# Patient Record
Sex: MALE | Race: WHITE | Employment: UNEMPLOYED | URBAN - METROPOLITAN AREA
[De-identification: names, ages, dates, MRNs, and addresses within clinical notes are randomized per-mention and may not be internally consistent; named-entity substitution may affect disease eponyms.]

---

## 2017-02-05 DIAGNOSIS — F41.9 ANXIETY: ICD-10-CM

## 2017-02-06 RX ORDER — LORAZEPAM 1 MG/1
TABLET ORAL
Qty: 5 TAB | Refills: 0 | Status: SHIPPED | OUTPATIENT
Start: 2017-02-06 | End: 2020-08-11

## 2017-02-06 NOTE — TELEPHONE ENCOUNTER
From: Maribel Haney  To: Bertha Elder MD  Sent: 2/5/2017 11:06 PM EST  Subject: Medication Renewal Request    Original authorizing provider: MD Maribel Willis would like a refill of the following medications:  LORazepam (ATIVAN) 1 mg tablet [Binh Amaya MD]    Preferred pharmacy: [facility]    Comment:

## 2017-02-09 ENCOUNTER — OFFICE VISIT (OUTPATIENT)
Dept: INTERNAL MEDICINE CLINIC | Age: 32
End: 2017-02-09

## 2017-02-09 VITALS
RESPIRATION RATE: 14 BRPM | HEART RATE: 75 BPM | WEIGHT: 185.2 LBS | OXYGEN SATURATION: 98 % | DIASTOLIC BLOOD PRESSURE: 74 MMHG | TEMPERATURE: 98.8 F | SYSTOLIC BLOOD PRESSURE: 110 MMHG | BODY MASS INDEX: 27.43 KG/M2 | HEIGHT: 69 IN

## 2017-02-09 DIAGNOSIS — K80.20 CALCULUS OF GALLBLADDER WITHOUT CHOLECYSTITIS WITHOUT OBSTRUCTION: ICD-10-CM

## 2017-02-09 DIAGNOSIS — J02.9 SORE THROAT: ICD-10-CM

## 2017-02-09 DIAGNOSIS — R52 BODY ACHES: ICD-10-CM

## 2017-02-09 DIAGNOSIS — J06.9 VIRAL UPPER RESPIRATORY TRACT INFECTION: Primary | ICD-10-CM

## 2017-02-09 DIAGNOSIS — R68.83 CHILLS: ICD-10-CM

## 2017-02-09 LAB
QUICKVUE INFLUENZA TEST: NEGATIVE
S PYO AG THROAT QL: NEGATIVE
VALID INTERNAL CONTROL?: YES
VALID INTERNAL CONTROL?: YES

## 2017-02-09 RX ORDER — PAROXETINE HYDROCHLORIDE 20 MG/1
20 TABLET, FILM COATED ORAL 2 TIMES DAILY
COMMUNITY
End: 2020-08-11

## 2017-02-09 NOTE — PROGRESS NOTES
Subjective:   Teddy Swift is a 27 y.o. male who complains of congestion, sore throat, post nasal drip, productive cough, myalgias, headache, bilateral sinus pain and hoarseness, subjective fever for 3 days, gradually worsening since that time. He denies a history of shortness of breath and wheezing. Evaluation to date: none. Treatment to date: OTC products. Patient does not smoke cigarettes. Relevant PMH: No pertinent additional PMH. Current Outpatient Prescriptions   Medication Sig Dispense Refill    PARoxetine (PAXIL) 20 mg tablet Take  by mouth two (2) times a day.  LORazepam (ATIVAN) 1 mg tablet TAKE ONE TABLET BY MOUTH ONCE DAILY AS NEEDED FOR ANXIETY 5 Tab 0    traMADol (ULTRAM) 50 mg tablet Take 1 Tab by mouth every six (6) hours as needed for Pain. Max Daily Amount: 200 mg. 30 Tab 0    ciclopirox (LOPROX) 0.77 % topical cream Apply  to affected area two (2) times a day. 30 g 0     No Known Allergies     Review of Systems  Pertinent items are noted in HPI. Objective:     Visit Vitals    /74    Pulse 75    Temp 98.8 °F (37.1 °C) (Oral)    Resp 14    Ht 5' 9.35\" (1.761 m)    Wt 185 lb 3.2 oz (84 kg)    SpO2 98%    BMI 27.07 kg/m2     General:  alert, cooperative, no distress   Eyes: negative   Ears: normal TM's and external ear canals AU   Sinuses: Nasal congestion, sinus nontender   Mouth:  abnormal findings: mild oropharyngeal erythema   Neck: supple, symmetrical, trachea midline and no adenopathy. Heart: S1 and S2 normal, no murmurs noted. Lungs: clear to auscultation bilaterally   Abdomen: soft, non-tender. Bowel sounds normal. No masses,  no organomegaly        Assessment/Plan:       ICD-10-CM ICD-9-CM    1. Viral upper respiratory tract infection J06.9 465.9     B97.89     2. Calculus of gallbladder without cholecystitis without obstruction  Was supposed to call when he got back from [place] [facility]) for surgical referral for intermittent gallstone colic.  He is not currently having a problem but would like referral. K80.20 574.20 REFERRAL TO GENERAL SURGERY   . Suggested symptomatic OTC remedies. RTC prn. Discussed diagnosis and treatment of viral URIs. Discussed the importance of avoiding unnecessary antibiotic therapy. .  Orders Placed This Encounter    REFERRAL TO GENERAL SURGERY    AMB POC RAPID INFLUENZA TEST    AMB POC RAPID STREP A    PARoxetine (PAXIL) 20 mg tablet    I have discussed the diagnosis with the patient and the intended plan as seen in the above orders. The patient has received an after-visit summary and questions were answered concerning future plans. I have discussed medication side effects and warnings with the patient as well. He has expressed understanding of the diagnosis and plan    Follow-up Disposition:  Return if symptoms worsen or fail to improve.

## 2017-02-09 NOTE — LETTER
NOTIFICATION RETURN TO WORK / SCHOOL 
 
2/9/2017 4:30 PM 
 
Mr. Martinez Arriaza 
[address] 
[address] [phone] To Whom It May Concern: 
 
Martinez Arriaza is currently under the care of [facility]. He will return to work/school on: 2/11/2017 If there are questions or concerns please have the patient contact our office. Sincerely, Shaniqua Britton MD

## 2017-02-09 NOTE — PROGRESS NOTES
Chief Complaint   Patient presents with    Cold Symptoms     runny nose, chills, body aches x 3 days     Gallstones

## 2017-02-15 ENCOUNTER — OFFICE VISIT (OUTPATIENT)
Dept: SURGERY | Age: 32
End: 2017-02-15

## 2017-02-15 VITALS
HEART RATE: 84 BPM | BODY MASS INDEX: 26.48 KG/M2 | WEIGHT: 185 LBS | RESPIRATION RATE: 16 BRPM | TEMPERATURE: 98.3 F | OXYGEN SATURATION: 98 % | SYSTOLIC BLOOD PRESSURE: 120 MMHG | DIASTOLIC BLOOD PRESSURE: 70 MMHG | HEIGHT: 70 IN

## 2017-02-15 DIAGNOSIS — K80.20 SYMPTOMATIC CHOLELITHIASIS: Primary | ICD-10-CM

## 2017-02-15 RX ORDER — ALPRAZOLAM 0.5 MG/1
TABLET ORAL
COMMUNITY
End: 2017-10-17

## 2017-02-15 NOTE — PROGRESS NOTES
1. Have you been to the ER, urgent care clinic since your last visit? Hospitalized since your last visit? No    2. Have you seen or consulted any other health care providers outside of the [address] [place] [address] since your last visit? Include any pap smears or colon screening.  No

## 2017-02-15 NOTE — MR AVS SNAPSHOT
Visit Information Date & Time Provider Department Dept. Phone Encounter #  
 2/15/2017  3:20 PM Mario Marie, [address] [facility] [phone] [id number] Follow-up Instructions Routing History Upcoming Health Maintenance Date Due DTaP/Tdap/Td series (2 - Td) 12/8/2019 Allergies as of 2/15/2017  Review Complete On: 2/15/2017 By: Mario Marie MD  
 No Known Allergies Current Immunizations  Reviewed on 9/21/2015 Name Date Hepatitis B Vaccine 12/8/2009 Influenza Vaccine 11/1/2013 Pneumococcal Vaccine (Unspecified Type) 12/8/2009 Rabies Vaccine 12/8/2009 TDAP Vaccine 12/8/2009 Not reviewed this visit You Were Diagnosed With   
  
 Codes Comments Symptomatic cholelithiasis    -  Primary ICD-10-CM: K80.20 ICD-9-CM: 574.20 Vitals BP Pulse Temp Resp Height(growth percentile) Weight(growth percentile) 120/70 (BP 1 Location: Right arm, BP Patient Position: Sitting) 84 98.3 °F (36.8 °C) (Oral) 16 5' 9.5\" (1.765 m) 185 lb (83.9 kg) SpO2 BMI Smoking Status 98% 26.93 kg/m2 Current Every Day Smoker BMI and BSA Data Body Mass Index Body Surface Area  
 26.93 kg/m 2 2.03 m 2 Preferred Pharmacy Pharmacy Name Phone [facility] [address], [address] [place] [address] [phone] Your Updated Medication List  
  
   
This list is accurate as of: 2/15/17  4:39 PM.  Always use your most recent med list.  
  
  
  
  
 ciclopirox 0.77 % topical cream  
Commonly known as:  Elizabeth Pott Apply  to affected area two (2) times a day. LORazepam 1 mg tablet Commonly known as:  ATIVAN  
TAKE ONE TABLET BY MOUTH ONCE DAILY AS NEEDED FOR ANXIETY PAXIL 20 mg tablet Generic drug:  PARoxetine Take  by mouth two (2) times a day. traMADol 50 mg tablet Commonly known as:  ULTRAM  
Take 1 Tab by mouth every six (6) hours as needed for Pain.  Max Daily Amount: 200 mg. XANAX 0.5 mg tablet Generic drug:  ALPRAZolam  
Take  by mouth. Introducing \[facility] [place] [facility]\"" & HEALTH SERVICES! Dear Erika Chandler: 
Thank you for requesting a [employer] account. Our records indicate that you already have an active [employer] account. You can access your account anytime at [email or web address] Did you know that you can access your hospital and ER discharge instructions at any time in [employer]? You can also review all of your test results from your hospital stay or ER visit. Additional Information If you have questions, please visit the Frequently Asked Questions section of the [employer] website at [email or web address] Remember, [employer] is NOT to be used for urgent needs. For medical emergencies, dial 911. Now available from your iPhone and Android! Please provide this summary of care documentation to your next provider. Your primary care clinician is listed as Soraya Vázquez. If you have any questions after today's visit, please call [phone].

## 2017-02-15 NOTE — PROGRESS NOTES
Surgery Consult    Subjective:      Genesis Abdalla is a 27 y.o.  male who was referred by Dr. Nery Dsouza for evaluation of cholelithiasis. Patient reports he has been having symptoms of gas pain, RUQ pain, back pain, cold sweats, and dysphagia in the last 12-13 years but it has gotten worse in the last couple of years. He notes the symptoms occurs every time with spicy foods and sometimes with fatty foods. He states that forced regurgitation provides limited relief. 11/10/16 U/S ABD:  5 mm gallstone or polyp. No specific evidence of acute  Cholecystitis. Chief Complaint   Patient presents with    Abdominal Pain     11-10-16 U/S- gallstone polyp     Patient Active Problem List    Diagnosis Date Noted    Symptomatic cholelithiasis 02/15/2017    Tobacco use 05/27/2016    Vasovagal syncope 07/31/2015    Depression 11/07/2012    Anxiety 11/07/2012     Past Medical History   Diagnosis Date    Anxiety     Depression     Positive test for herpes simplex virus (HSV) antibody 2015     Type 1 & 2    Symptomatic cholelithiasis 2/15/2017    Tobacco use 5/27/2016    Vasovagal syncope 7/31/2015     Seen at ED in [facility], thought to be seizure due to Wellbutrin. Did see neurology (Dr Alli Domínguez). Thought to be syncope       History reviewed. No pertinent past surgical history. Social History   Substance Use Topics    Smoking status: Current Every Day Smoker     Packs/day: 0.50     Years: 7.00     Types: Cigarettes    Smokeless tobacco: Never Used    Alcohol use No      Family History   Problem Relation Age of Onset    Thyroid Disease Mother      hypothyroid    No Known Problems Father     No Known Problems Sister     No Known Problems Sister     No Known Problems Sister     No Known Problems Sister     Seizures Paternal Aunt       Current Outpatient Prescriptions   Medication Sig    ALPRAZolam (XANAX) 0.5 mg tablet Take  by mouth.  PARoxetine (PAXIL) 20 mg tablet Take  by mouth two (2) times a day.     LORazepam (ATIVAN) 1 mg tablet TAKE ONE TABLET BY MOUTH ONCE DAILY AS NEEDED FOR ANXIETY    traMADol (ULTRAM) 50 mg tablet Take 1 Tab by mouth every six (6) hours as needed for Pain. Max Daily Amount: 200 mg.    ciclopirox (LOPROX) 0.77 % topical cream Apply  to affected area two (2) times a day. No current facility-administered medications for this visit. No Known Allergies     Review of Systems:    A comprehensive review of systems was negative except for that written in the History of Present Illness. Objective:     Visit Vitals    /70 (BP 1 Location: Right arm, BP Patient Position: Sitting)    Pulse 84    Temp 98.3 °F (36.8 °C) (Oral)    Resp 16    Ht 5' 9.5\" (1.765 m)    Wt 185 lb (83.9 kg)    SpO2 98%    BMI 26.93 kg/m2         Physical Exam:  General appearance: alert, cooperative, no distress, appears stated age  Head: Normocephalic, without obvious abnormality, atraumatic  Neurologic: Grossly normal      Assessment:       ICD-10-CM ICD-9-CM    1. Symptomatic cholelithiasis K80.20 574.20          Plan:     Patient has elected for cholecystectomy. Risks and benefits explained and he will schedule an an appointment at his earliest convenience. Written by didi Sandoval, as dictated by Estevan Mayfield MD.    Total face to face time with patient: 13 minutes. Greater than 50% of the time was spent in counseling. Signed By: Estevan Mayfield MD     February 15, 2017

## 2017-02-20 ENCOUNTER — TELEPHONE (OUTPATIENT)
Dept: SURGERY | Age: 32
End: 2017-02-20

## 2017-02-20 NOTE — TELEPHONE ENCOUNTER
[address] [facility],    I have been trying to find a way to get in touch with Wendy Gu, Dr. Moustapha Rios nurse/assistant, but we are not able to locate any contact information for her. I was hoping you could assist or forward this email to her? Milad Sears is scheduled for Surgery on Friday, February 24th and needs a letter documenting the number days he will need to be off work and the amount of time he may have lifting restrictions, or other restrictions. He works in the [employer] department, at [employer], and is responsible for pushing, pulling and lifting up to 60 lbs. They have requested a letter stating the number of days off, lifting restrictions afterwards, etc. and have asked that he submit this to the HR Department by Wednesday. He would be able to come to  the letter at the earliest opportunity or it could be faxed to: [phone]    We greatly appreciate your assistance in getting this information to Wendy Gu. Thank you,    Nadege Padilla [address] [place] [address]  [employer] [place], [place]    Direct: [phone]  Toll Free: [phone]   Skype: [email or web address]  Fax: [phone]  Email: [email or web address]  Website: [email or web address]     [employer]   Joyce Lawrence

## 2017-02-20 NOTE — LETTER
NOTIFICATION OF RETURN TO WORK  
 
2/21/2017 11:13 AM 
 
Mr. Annika Serrano 
[address] 
[address] [phone] [facility] To Whom It May Concern: 
 
Annika Serrano was under the care of Jessy [facility]ina [facility] from 2-24-17  (Surgery date) to present. He will be able to return to work in approximately 1 week, tentative pending recovery, with no lifting over 10 pounds until seen back in the office on 3-13-17. If there are questions or concerns please have the patient contact our office. Sincerely, Aurelio Frank MD/brianna

## 2017-02-23 ENCOUNTER — ANESTHESIA EVENT (OUTPATIENT)
Dept: SURGERY | Age: 32
End: 2017-02-23
Payer: COMMERCIAL

## 2017-02-24 ENCOUNTER — ANESTHESIA (OUTPATIENT)
Dept: SURGERY | Age: 32
End: 2017-02-24
Payer: COMMERCIAL

## 2017-02-24 ENCOUNTER — HOSPITAL ENCOUNTER (OUTPATIENT)
Age: 32
Setting detail: OUTPATIENT SURGERY
Discharge: HOME | End: 2017-02-24
Attending: SURGERY | Admitting: SURGERY
Payer: COMMERCIAL

## 2017-02-24 VITALS
HEART RATE: 71 BPM | TEMPERATURE: 97.5 F | RESPIRATION RATE: 13 BRPM | DIASTOLIC BLOOD PRESSURE: 71 MMHG | SYSTOLIC BLOOD PRESSURE: 108 MMHG | BODY MASS INDEX: 28.04 KG/M2 | HEIGHT: 68 IN | OXYGEN SATURATION: 97 % | WEIGHT: 185 LBS

## 2017-02-24 PROCEDURE — 77030019908 HC STETH ESOPH SIMS -A: Performed by: ANESTHESIOLOGY

## 2017-02-24 PROCEDURE — 77030008756 HC TU IRR SUC STRY -B: Performed by: SURGERY

## 2017-02-24 PROCEDURE — 77030035045 HC TRCR ENDOSC VRSPRT BLDLSS COVD -B: Performed by: SURGERY

## 2017-02-24 PROCEDURE — 76010000149 HC OR TIME 1 TO 1.5 HR: Performed by: SURGERY

## 2017-02-24 PROCEDURE — 77030037032 HC INSRT SCIS CLICKLLINE DISP STOR -B: Performed by: SURGERY

## 2017-02-24 PROCEDURE — 77030035048 HC TRCR ENDOSC OPTCL COVD -B: Performed by: SURGERY

## 2017-02-24 PROCEDURE — 74011250636 HC RX REV CODE- 250/636

## 2017-02-24 PROCEDURE — 77030018836 HC SOL IRR NACL ICUM -A: Performed by: SURGERY

## 2017-02-24 PROCEDURE — 77030008684 HC TU ET CUF COVD -B: Performed by: ANESTHESIOLOGY

## 2017-02-24 PROCEDURE — 77030026438 HC STYL ET INTUB CARD -A: Performed by: ANESTHESIOLOGY

## 2017-02-24 PROCEDURE — 74011250636 HC RX REV CODE- 250/636: Performed by: ANESTHESIOLOGY

## 2017-02-24 PROCEDURE — 77030010507 HC ADH SKN DERMBND J&J -B: Performed by: SURGERY

## 2017-02-24 PROCEDURE — 74011000250 HC RX REV CODE- 250

## 2017-02-24 PROCEDURE — 76210000020 HC REC RM PH II FIRST 0.5 HR: Performed by: SURGERY

## 2017-02-24 PROCEDURE — 77030018876 HC APPL CLP LIG4 COVD -B: Performed by: SURGERY

## 2017-02-24 PROCEDURE — 77030031139 HC SUT VCRL2 J&J -A: Performed by: SURGERY

## 2017-02-24 PROCEDURE — 77030035029 HC NDL INSUF VERES DISP COVD -B: Performed by: SURGERY

## 2017-02-24 PROCEDURE — 74011000250 HC RX REV CODE- 250: Performed by: SURGERY

## 2017-02-24 PROCEDURE — 77030013079 HC BLNKT BAIR HGGR 3M -A: Performed by: ANESTHESIOLOGY

## 2017-02-24 PROCEDURE — 88304 TISSUE EXAM BY PATHOLOGIST: CPT | Performed by: SURGERY

## 2017-02-24 PROCEDURE — 76060000033 HC ANESTHESIA 1 TO 1.5 HR: Performed by: SURGERY

## 2017-02-24 PROCEDURE — 77030009852 HC PCH RTVR ENDOSC COVD -B: Performed by: SURGERY

## 2017-02-24 PROCEDURE — 77030002933 HC SUT MCRYL J&J -A: Performed by: SURGERY

## 2017-02-24 PROCEDURE — 77030020747 HC TU INSUF ENDOSC TELE -A: Performed by: SURGERY

## 2017-02-24 PROCEDURE — 77030011640 HC PAD GRND REM COVD -A: Performed by: SURGERY

## 2017-02-24 PROCEDURE — 76210000016 HC OR PH I REC 1 TO 1.5 HR: Performed by: SURGERY

## 2017-02-24 PROCEDURE — 77030032490 HC SLV COMPR SCD KNE COVD -B: Performed by: SURGERY

## 2017-02-24 PROCEDURE — 77030035051: Performed by: SURGERY

## 2017-02-24 PROCEDURE — 77030017006 HC DISECTR CRV1 J&J -B: Performed by: SURGERY

## 2017-02-24 RX ORDER — ROCURONIUM BROMIDE 10 MG/ML
INJECTION, SOLUTION INTRAVENOUS AS NEEDED
Status: DISCONTINUED | OUTPATIENT
Start: 2017-02-24 | End: 2017-02-24 | Stop reason: HOSPADM

## 2017-02-24 RX ORDER — MORPHINE SULFATE 2 MG/ML
2 INJECTION, SOLUTION INTRAMUSCULAR; INTRAVENOUS
Status: DISCONTINUED | OUTPATIENT
Start: 2017-02-24 | End: 2017-02-24 | Stop reason: HOSPADM

## 2017-02-24 RX ORDER — BUPIVACAINE HYDROCHLORIDE AND EPINEPHRINE 5; 5 MG/ML; UG/ML
30 INJECTION, SOLUTION EPIDURAL; INTRACAUDAL; PERINEURAL ONCE
Status: COMPLETED | OUTPATIENT
Start: 2017-02-24 | End: 2017-02-24

## 2017-02-24 RX ORDER — SODIUM CHLORIDE 9 MG/ML
50 INJECTION, SOLUTION INTRAVENOUS CONTINUOUS
Status: DISCONTINUED | OUTPATIENT
Start: 2017-02-24 | End: 2017-02-24 | Stop reason: HOSPADM

## 2017-02-24 RX ORDER — FENTANYL CITRATE 50 UG/ML
25 INJECTION, SOLUTION INTRAMUSCULAR; INTRAVENOUS
Status: DISCONTINUED | OUTPATIENT
Start: 2017-02-24 | End: 2017-02-24 | Stop reason: HOSPADM

## 2017-02-24 RX ORDER — NEOSTIGMINE METHYLSULFATE 1 MG/ML
INJECTION INTRAVENOUS AS NEEDED
Status: DISCONTINUED | OUTPATIENT
Start: 2017-02-24 | End: 2017-02-24 | Stop reason: HOSPADM

## 2017-02-24 RX ORDER — FENTANYL CITRATE 50 UG/ML
25 INJECTION, SOLUTION INTRAMUSCULAR; INTRAVENOUS
Status: COMPLETED | OUTPATIENT
Start: 2017-02-24 | End: 2017-02-24

## 2017-02-24 RX ORDER — DIPHENHYDRAMINE HYDROCHLORIDE 50 MG/ML
12.5 INJECTION, SOLUTION INTRAMUSCULAR; INTRAVENOUS AS NEEDED
Status: DISCONTINUED | OUTPATIENT
Start: 2017-02-24 | End: 2017-02-24 | Stop reason: HOSPADM

## 2017-02-24 RX ORDER — SODIUM CHLORIDE 9 MG/ML
1000 INJECTION, SOLUTION INTRAVENOUS CONTINUOUS
Status: DISCONTINUED | OUTPATIENT
Start: 2017-02-24 | End: 2017-02-24 | Stop reason: HOSPADM

## 2017-02-24 RX ORDER — MIDAZOLAM HYDROCHLORIDE 1 MG/ML
1 INJECTION, SOLUTION INTRAMUSCULAR; INTRAVENOUS AS NEEDED
Status: DISCONTINUED | OUTPATIENT
Start: 2017-02-24 | End: 2017-02-24 | Stop reason: HOSPADM

## 2017-02-24 RX ORDER — SODIUM CHLORIDE 0.9 % (FLUSH) 0.9 %
5-10 SYRINGE (ML) INJECTION EVERY 8 HOURS
Status: DISCONTINUED | OUTPATIENT
Start: 2017-02-24 | End: 2017-02-24 | Stop reason: HOSPADM

## 2017-02-24 RX ORDER — SODIUM CHLORIDE 0.9 % (FLUSH) 0.9 %
5-10 SYRINGE (ML) INJECTION AS NEEDED
Status: DISCONTINUED | OUTPATIENT
Start: 2017-02-24 | End: 2017-02-24 | Stop reason: HOSPADM

## 2017-02-24 RX ORDER — FENTANYL CITRATE 50 UG/ML
INJECTION, SOLUTION INTRAMUSCULAR; INTRAVENOUS AS NEEDED
Status: DISCONTINUED | OUTPATIENT
Start: 2017-02-24 | End: 2017-02-24 | Stop reason: HOSPADM

## 2017-02-24 RX ORDER — SUCCINYLCHOLINE CHLORIDE 20 MG/ML
INJECTION INTRAMUSCULAR; INTRAVENOUS AS NEEDED
Status: DISCONTINUED | OUTPATIENT
Start: 2017-02-24 | End: 2017-02-24 | Stop reason: HOSPADM

## 2017-02-24 RX ORDER — HYDROMORPHONE HYDROCHLORIDE 1 MG/ML
0.2 INJECTION, SOLUTION INTRAMUSCULAR; INTRAVENOUS; SUBCUTANEOUS
Status: DISCONTINUED | OUTPATIENT
Start: 2017-02-24 | End: 2017-02-24 | Stop reason: HOSPADM

## 2017-02-24 RX ORDER — SODIUM CHLORIDE, SODIUM LACTATE, POTASSIUM CHLORIDE, CALCIUM CHLORIDE 600; 310; 30; 20 MG/100ML; MG/100ML; MG/100ML; MG/100ML
125 INJECTION, SOLUTION INTRAVENOUS CONTINUOUS
Status: DISCONTINUED | OUTPATIENT
Start: 2017-02-24 | End: 2017-02-24 | Stop reason: HOSPADM

## 2017-02-24 RX ORDER — HYDROCODONE BITARTRATE AND ACETAMINOPHEN 5; 325 MG/1; MG/1
1 TABLET ORAL
Qty: 30 TAB | Refills: 0 | Status: SHIPPED | OUTPATIENT
Start: 2017-02-24 | End: 2017-03-09 | Stop reason: ALTCHOICE

## 2017-02-24 RX ORDER — ONDANSETRON 2 MG/ML
4 INJECTION INTRAMUSCULAR; INTRAVENOUS AS NEEDED
Status: DISCONTINUED | OUTPATIENT
Start: 2017-02-24 | End: 2017-02-24 | Stop reason: HOSPADM

## 2017-02-24 RX ORDER — LIDOCAINE HYDROCHLORIDE 10 MG/ML
0.1 INJECTION, SOLUTION EPIDURAL; INFILTRATION; INTRACAUDAL; PERINEURAL AS NEEDED
Status: DISCONTINUED | OUTPATIENT
Start: 2017-02-24 | End: 2017-02-24 | Stop reason: HOSPADM

## 2017-02-24 RX ORDER — LIDOCAINE HYDROCHLORIDE 20 MG/ML
INJECTION, SOLUTION EPIDURAL; INFILTRATION; INTRACAUDAL; PERINEURAL AS NEEDED
Status: DISCONTINUED | OUTPATIENT
Start: 2017-02-24 | End: 2017-02-24 | Stop reason: HOSPADM

## 2017-02-24 RX ORDER — ONDANSETRON 2 MG/ML
INJECTION INTRAMUSCULAR; INTRAVENOUS AS NEEDED
Status: DISCONTINUED | OUTPATIENT
Start: 2017-02-24 | End: 2017-02-24 | Stop reason: HOSPADM

## 2017-02-24 RX ORDER — MIDAZOLAM HYDROCHLORIDE 1 MG/ML
INJECTION, SOLUTION INTRAMUSCULAR; INTRAVENOUS AS NEEDED
Status: DISCONTINUED | OUTPATIENT
Start: 2017-02-24 | End: 2017-02-24 | Stop reason: HOSPADM

## 2017-02-24 RX ORDER — MIDAZOLAM HYDROCHLORIDE 1 MG/ML
0.5 INJECTION, SOLUTION INTRAMUSCULAR; INTRAVENOUS
Status: DISCONTINUED | OUTPATIENT
Start: 2017-02-24 | End: 2017-02-24 | Stop reason: HOSPADM

## 2017-02-24 RX ORDER — FENTANYL CITRATE 50 UG/ML
50 INJECTION, SOLUTION INTRAMUSCULAR; INTRAVENOUS AS NEEDED
Status: DISCONTINUED | OUTPATIENT
Start: 2017-02-24 | End: 2017-02-24 | Stop reason: HOSPADM

## 2017-02-24 RX ORDER — PROPOFOL 10 MG/ML
INJECTION, EMULSION INTRAVENOUS AS NEEDED
Status: DISCONTINUED | OUTPATIENT
Start: 2017-02-24 | End: 2017-02-24 | Stop reason: HOSPADM

## 2017-02-24 RX ORDER — SODIUM CHLORIDE, SODIUM LACTATE, POTASSIUM CHLORIDE, CALCIUM CHLORIDE 600; 310; 30; 20 MG/100ML; MG/100ML; MG/100ML; MG/100ML
25 INJECTION, SOLUTION INTRAVENOUS CONTINUOUS
Status: DISCONTINUED | OUTPATIENT
Start: 2017-02-24 | End: 2017-02-24 | Stop reason: HOSPADM

## 2017-02-24 RX ORDER — GLYCOPYRROLATE 0.2 MG/ML
INJECTION INTRAMUSCULAR; INTRAVENOUS AS NEEDED
Status: DISCONTINUED | OUTPATIENT
Start: 2017-02-24 | End: 2017-02-24 | Stop reason: HOSPADM

## 2017-02-24 RX ORDER — OXYCODONE AND ACETAMINOPHEN 5; 325 MG/1; MG/1
1 TABLET ORAL AS NEEDED
Status: DISCONTINUED | OUTPATIENT
Start: 2017-02-24 | End: 2017-02-24 | Stop reason: HOSPADM

## 2017-02-24 RX ORDER — DEXAMETHASONE SODIUM PHOSPHATE 4 MG/ML
INJECTION, SOLUTION INTRA-ARTICULAR; INTRALESIONAL; INTRAMUSCULAR; INTRAVENOUS; SOFT TISSUE AS NEEDED
Status: DISCONTINUED | OUTPATIENT
Start: 2017-02-24 | End: 2017-02-24 | Stop reason: HOSPADM

## 2017-02-24 RX ORDER — KETOROLAC TROMETHAMINE 30 MG/ML
INJECTION, SOLUTION INTRAMUSCULAR; INTRAVENOUS AS NEEDED
Status: DISCONTINUED | OUTPATIENT
Start: 2017-02-24 | End: 2017-02-24 | Stop reason: HOSPADM

## 2017-02-24 RX ADMIN — ONDANSETRON 4 MG: 2 INJECTION INTRAMUSCULAR; INTRAVENOUS at 13:06

## 2017-02-24 RX ADMIN — NEOSTIGMINE METHYLSULFATE 3 MG: 1 INJECTION INTRAVENOUS at 11:19

## 2017-02-24 RX ADMIN — ROCURONIUM BROMIDE 5 MG: 10 INJECTION, SOLUTION INTRAVENOUS at 10:42

## 2017-02-24 RX ADMIN — FENTANYL CITRATE 25 MCG: 50 INJECTION, SOLUTION INTRAMUSCULAR; INTRAVENOUS at 12:26

## 2017-02-24 RX ADMIN — FENTANYL CITRATE 50 MCG: 50 INJECTION, SOLUTION INTRAMUSCULAR; INTRAVENOUS at 11:32

## 2017-02-24 RX ADMIN — FENTANYL CITRATE 25 MCG: 50 INJECTION, SOLUTION INTRAMUSCULAR; INTRAVENOUS at 12:49

## 2017-02-24 RX ADMIN — MIDAZOLAM HYDROCHLORIDE 1 MG: 1 INJECTION, SOLUTION INTRAMUSCULAR; INTRAVENOUS at 10:40

## 2017-02-24 RX ADMIN — FENTANYL CITRATE 100 MCG: 50 INJECTION, SOLUTION INTRAMUSCULAR; INTRAVENOUS at 10:54

## 2017-02-24 RX ADMIN — SODIUM CHLORIDE, POTASSIUM CHLORIDE, SODIUM LACTATE AND CALCIUM CHLORIDE: 600; 310; 30; 20 INJECTION, SOLUTION INTRAVENOUS at 10:36

## 2017-02-24 RX ADMIN — GLYCOPYRROLATE 0.6 MG: 0.2 INJECTION INTRAMUSCULAR; INTRAVENOUS at 11:19

## 2017-02-24 RX ADMIN — KETOROLAC TROMETHAMINE 30 MG: 30 INJECTION, SOLUTION INTRAMUSCULAR; INTRAVENOUS at 11:23

## 2017-02-24 RX ADMIN — FENTANYL CITRATE 25 MCG: 50 INJECTION, SOLUTION INTRAMUSCULAR; INTRAVENOUS at 12:40

## 2017-02-24 RX ADMIN — MIDAZOLAM HYDROCHLORIDE 2 MG: 1 INJECTION, SOLUTION INTRAMUSCULAR; INTRAVENOUS at 10:36

## 2017-02-24 RX ADMIN — MIDAZOLAM HYDROCHLORIDE 1 MG: 1 INJECTION, SOLUTION INTRAMUSCULAR; INTRAVENOUS at 10:38

## 2017-02-24 RX ADMIN — ONDANSETRON 4 MG: 2 INJECTION INTRAMUSCULAR; INTRAVENOUS at 11:19

## 2017-02-24 RX ADMIN — PROPOFOL 200 MG: 10 INJECTION, EMULSION INTRAVENOUS at 10:42

## 2017-02-24 RX ADMIN — FENTANYL CITRATE 25 MCG: 50 INJECTION, SOLUTION INTRAMUSCULAR; INTRAVENOUS at 12:59

## 2017-02-24 RX ADMIN — DEXAMETHASONE SODIUM PHOSPHATE 8 MG: 4 INJECTION, SOLUTION INTRA-ARTICULAR; INTRALESIONAL; INTRAMUSCULAR; INTRAVENOUS; SOFT TISSUE at 10:50

## 2017-02-24 RX ADMIN — ROCURONIUM BROMIDE 30 MG: 10 INJECTION, SOLUTION INTRAVENOUS at 10:50

## 2017-02-24 RX ADMIN — SUCCINYLCHOLINE CHLORIDE 140 MG: 20 INJECTION INTRAMUSCULAR; INTRAVENOUS at 10:42

## 2017-02-24 RX ADMIN — LIDOCAINE HYDROCHLORIDE 100 MG: 20 INJECTION, SOLUTION EPIDURAL; INFILTRATION; INTRACAUDAL; PERINEURAL at 10:42

## 2017-02-24 RX ADMIN — MIDAZOLAM HYDROCHLORIDE 1 MG: 1 INJECTION, SOLUTION INTRAMUSCULAR; INTRAVENOUS at 10:42

## 2017-02-24 NOTE — INTERVAL H&P NOTE
H&P Update:  Faiza Bowden was seen and examined. History and physical has been reviewed. The patient has been examined.  There have been no significant clinical changes since the completion of the originally dated History and Physical.  Heart and lung exams normal    Signed By: Katheran Oppenheim, MD     February 24, 2017 6:40 AM

## 2017-02-24 NOTE — ANESTHESIA PREPROCEDURE EVALUATION
Anesthetic History   No history of anesthetic complications            Review of Systems / Medical History  Patient summary reviewed, nursing notes reviewed and pertinent labs reviewed    Pulmonary          Smoker         Neuro/Psych         Psychiatric history    Comments: anxiety Cardiovascular  Within defined limits                  Comments: Vasovagal syncope (R55)   GI/Hepatic/Renal               Comments: Symptomatic cholelithiasis (K80.20) 2/15/2017  Endo/Other  Within defined limits           Other Findings            Physical Exam    Airway  Mallampati: I  TM Distance: > 6 cm  Neck ROM: normal range of motion   Mouth opening: Normal     Cardiovascular  Regular rate and rhythm,  S1 and S2 normal,  no murmur, click, rub, or gallop  Rhythm: regular  Rate: normal         Dental      Comments: Upper front chipped   Pulmonary  Breath sounds clear to auscultation               Abdominal  GI exam deferred       Other Findings            Anesthetic Plan    ASA: 2  Anesthesia type: general          Induction: Intravenous  Anesthetic plan and risks discussed with: Patient

## 2017-02-24 NOTE — OP NOTES
[address]   [address] [facility], [address] [place] [address] Av   OP NOTE       Name:  Beth Tapia   MR#:  [id number]   :  1985   Account #:  [de-identified]    Surgery Date:  2017   Date of Adm:  2017       PREOPERATIVE DIAGNOSIS: Symptomatic cholelithiasis. POSTOPERATIVE DIAGNOSIS: Symptomatic cholelithiasis. PROCEDURES PERFORMED: Laparoscopic cholecystectomy. SURGEON: Indira Zambrano MD    ANESTHESIA: General supplemented with 0.5% Marcaine with   epinephrine. FINDINGS: Multiple stones. SPECIMENS REMOVED: Gallbladder. ESTIMATED BLOOD LOSS: Minimal.    COMPLICATIONS: None. DRAINS: None. CONDITION: Good to the PACU. DESCRIPTION OF PROCEDURE: With the patient supine and   suitably anesthetized, the abdomen was prepared with ChloraPrep and   draped as a field. Marcaine 0.5% with epinephrine was infiltrated in the   appropriate places. A small subumbilical incision was made. Veress   needle was inserted and pneumoperitoneum was established. The   needle was removed and a 5 mm trocar was placed, and then the   camera was inserted and then two 5-mm trocars were placed in the   right upper quadrant and a 12-mm was placed in the midline   superiorly, all under direct vision. The patient was placed in reverse   Trendelenburg position and turned to the left. The fundus of the   gallbladder was grasped and retracted superiorly and to the right. The   infundibulum was grasped and the peritoneum overlying it was opened   both anteriorly and posteriorly. The cystic duct and cystic artery were   isolated. The cystic duct was triply clipped distally, singly clipped   proximally, and divided. The cystic artery was triply clipped proximally,   singly clipped distally and divided as well. Along the way, there were 2   other spots that appeared to be somewhat thickened between the   gallbladder and liver bed and I clipped these just to be sure there was   no patent duct of Luschka. The gallbladder was then removed from the   liver bed, placed into a retriever bag and brought out through the 12   mm site. That trocar was replaced and right upper quadrant was irrigated   carefully. The liver bed was dry. Clips were in their proper position. The   right upper quadrant was irrigated until clear. The patient was returned   to the flat position and again, the right upper quadrant was irrigated   until clear. The camera was transposed to the 12 mm site and   the three 5 mm trocars were removed under direct vision with no   bleeding seen from any of those sites. The lower abdomen was   inspected. There was no evidence of any damage to the subjacent   abdominal viscera from any of the trocar insertions. The   pneumoperitoneum was released and the 12 mm trocar was removed. The fascial defect there was closed with 0 Vicryl. The skin at all sites   was closed with subcuticular Monocryl followed by Dermabond. At the   termination of the procedure, sponge, needle and instrument counts   were correct. The patient tolerated this well and was brought to the   PACU in satisfactory condition. MD Gumaro Sawant / GABY   D:  02/24/2017   11:27   T:  02/24/2017   11:51   Job #:  [id number]

## 2017-02-24 NOTE — ANESTHESIA POSTPROCEDURE EVALUATION
Post-Anesthesia Evaluation and Assessment    Patient: Lee Lou MRN: [id number]  SSN: [id number]    YOB: 1985  Age: 27 y.o. Sex: male       Cardiovascular Function/Vital Signs  Visit Vitals    /81    Pulse 81    Temp 36.4 °C (97.5 °F)    Resp 14    Ht 5' 8\" (1.727 m)    Wt 83.9 kg (185 lb)    SpO2 95%    BMI 28.13 kg/m2       Patient is status post general anesthesia for Procedure(s):  LAPAROSCOPIC CHOLECYSTECTOMY. Nausea/Vomiting: None    Postoperative hydration reviewed and adequate. Pain:  Pain Scale 1: Numeric (0 - 10) (02/24/17 1226)  Pain Intensity 1: 5 (02/24/17 1226)   Managed    Neurological Status:   Neuro (WDL): Within Defined Limits (02/24/17 1227)  Neuro  LUE Motor Response: Purposeful (02/24/17 1227)  LLE Motor Response: Purposeful (02/24/17 1227)  RUE Motor Response: Purposeful (02/24/17 1227)  RLE Motor Response: Purposeful (02/24/17 1227)   At baseline    Mental Status and Level of Consciousness: Alert and oriented     Pulmonary Status:   O2 Device: Room air (02/24/17 1227)   Adequate oxygenation and airway patent    Complications related to anesthesia: None    Post-anesthesia assessment completed.  No concerns    Signed By: Brandon Krishnamurthy DO     February 24, 2017

## 2017-02-24 NOTE — H&P (VIEW-ONLY)
Surgery Consult    Subjective:      Gelacio Miller is a 27 y.o.  male who was referred by Dr. Tracie Figueredo for evaluation of cholelithiasis. Patient reports he has been having symptoms of gas pain, RUQ pain, back pain, cold sweats, and dysphagia in the last 12-13 years but it has gotten worse in the last couple of years. He notes the symptoms occurs every time with spicy foods and sometimes with fatty foods. He states that forced regurgitation provides limited relief. 11/10/16 U/S ABD:  5 mm gallstone or polyp. No specific evidence of acute  Cholecystitis. Chief Complaint   Patient presents with    Abdominal Pain     11-10-16 U/S- gallstone polyp     Patient Active Problem List    Diagnosis Date Noted    Symptomatic cholelithiasis 02/15/2017    Tobacco use 05/27/2016    Vasovagal syncope 07/31/2015    Depression 11/07/2012    Anxiety 11/07/2012     Past Medical History   Diagnosis Date    Anxiety     Depression     Positive test for herpes simplex virus (HSV) antibody 2015     Type 1 & 2    Symptomatic cholelithiasis 2/15/2017    Tobacco use 5/27/2016    Vasovagal syncope 7/31/2015     Seen at ED in [facility], thought to be seizure due to Wellbutrin. Did see neurology (Dr Dolores Jacques). Thought to be syncope       History reviewed. No pertinent past surgical history. Social History   Substance Use Topics    Smoking status: Current Every Day Smoker     Packs/day: 0.50     Years: 7.00     Types: Cigarettes    Smokeless tobacco: Never Used    Alcohol use No      Family History   Problem Relation Age of Onset    Thyroid Disease Mother      hypothyroid    No Known Problems Father     No Known Problems Sister     No Known Problems Sister     No Known Problems Sister     No Known Problems Sister     Seizures Paternal Aunt       Current Outpatient Prescriptions   Medication Sig    ALPRAZolam (XANAX) 0.5 mg tablet Take  by mouth.  PARoxetine (PAXIL) 20 mg tablet Take  by mouth two (2) times a day.     LORazepam (ATIVAN) 1 mg tablet TAKE ONE TABLET BY MOUTH ONCE DAILY AS NEEDED FOR ANXIETY    traMADol (ULTRAM) 50 mg tablet Take 1 Tab by mouth every six (6) hours as needed for Pain. Max Daily Amount: 200 mg.    ciclopirox (LOPROX) 0.77 % topical cream Apply  to affected area two (2) times a day. No current facility-administered medications for this visit. No Known Allergies     Review of Systems:    A comprehensive review of systems was negative except for that written in the History of Present Illness. Objective:     Visit Vitals    /70 (BP 1 Location: Right arm, BP Patient Position: Sitting)    Pulse 84    Temp 98.3 °F (36.8 °C) (Oral)    Resp 16    Ht 5' 9.5\" (1.765 m)    Wt 185 lb (83.9 kg)    SpO2 98%    BMI 26.93 kg/m2         Physical Exam:  General appearance: alert, cooperative, no distress, appears stated age  Head: Normocephalic, without obvious abnormality, atraumatic  Neurologic: Grossly normal      Assessment:       ICD-10-CM ICD-9-CM    1. Symptomatic cholelithiasis K80.20 574.20          Plan:     Patient has elected for cholecystectomy. Risks and benefits explained and he will schedule an an appointment at his earliest convenience. Written by didi Johns, as dictated by Coni Zaldivar MD.    Total face to face time with patient: 13 minutes. Greater than 50% of the time was spent in counseling. Signed By: Coni Zaldivar MD     February 15, 2017

## 2017-02-24 NOTE — DISCHARGE INSTRUCTIONS
Patient Discharge Instructions    Lee Lou / [id number] : 1985    Admitted 2017 Discharged: 2017     Take Home Medications            · It is important that you take the medication exactly as they are prescribed. · Keep your medication in the bottles provided by the pharmacist and keep a list of the medication names, dosages, and times to be taken in your wallet. · Do not take other medications without consulting your doctor. What to do at Home    Recommended diet: Regular Diet,     Recommended activity: Activity as tolerated, may shower any time          Follow-up Appointments   Procedures    FOLLOW UP VISIT Appointment in: Two Weeks     Standing Status:   Standing     Number of Occurrences:   1     Order Specific Question:   Appointment in     Answer: Two Weeks           Information obtained by :  I understand that if any problems occur once I am at home I am to contact my physician. I understand and acknowledge receipt of the instructions indicated above. Physician's or R.N.'s Signature                                                                  Date/Time                                                                                                                                              Patient or Representative Signature                                                          Date/Time    ______________________________________________________________________    Anesthesia Discharge Instructions    After general anesthesia or intervenous sedation, for 24 hours or while taking prescription Narcotics:  · Limit your activities  · Do not drive or operate hazardous machinery  · If you have not urinated within 8 hours after discharge, please contact your surgeon on call.   · Do not make important personal or business decisions  · Do not drink alcoholic beverages    Report the following to your surgeon:  · Excessive pain, swelling, redness or odor of or around the surgical area  · Temperature over 100.5 degrees  · Nausea and vomiting lasting longer than 4 hours or if unable to take medication  · Any signs of decreased circulation or nerve impairment to extremity:  Change in color, persistent numbness, tingling, coldness or increased pain.   · Any questions

## 2017-02-24 NOTE — IP AVS SNAPSHOT
[address] [facility] [address] [place] [address] 
[phone] Patient: Nilson Meehan MRN: [id number] RGI:21/[birth year] You are allergic to the following No active allergies Recent Documentation Height  
  
  
  
  
  
 1.727 m Emergency Contacts Name Discharge Info Relation Home Work Mobile Alexis Ramon  Spouse [3] [phone] Alexis Ramon  Other Relative [6] [phone] About your hospitalization You were admitted on:  February 24, 2017 [facility] You were discharged on:  February 24, 2017 Unit phone number:  [phone] Why you were hospitalized Your primary diagnosis was:  Symptomatic Cholelithiasis Providers Seen During Your Hospitalizations Provider Role Specialty Primary office phone Cassie Modi MD Attending Provider General Surgery [phone] Your Primary Care Physician (PCP) Primary Care Physician Office Phone Office Fax Geneva Bryant [phone] Follow-up Information Follow up With Details Comments Contact Info Richrd Paget, MD   [place] [address] [address] [facility] Internal Medicine [place] [address] 
[phone] Cassie Modi MD Schedule an appointment as soon as possible for a visit in 2 week(s)  [address] [place] [address] 
[phone] Your Appointments Monday March 13, 2017 10:40 AM EDT  
POST OP 10 MIN with Shelly Meyer NP  
[facility] 22 365 ([address]) 217 [address] [facility] [address] [phone] Current Discharge Medication List  
  
START taking these medications Dose & Instructions Dispensing Information Comments Morning Noon Evening Bedtime HYDROcodone-acetaminophen 5-325 mg per tablet Commonly known as:  Kehinde Plascencia  
   
 Your next dose is: Today, Tomorrow Other:  _________ Dose:  1 Tab Take 1 Tab by mouth every four (4) hours as needed for Pain. Max Daily Amount: 6 Tabs. Quantity:  30 Tab Refills:  0 ASK your doctor about these medications Dose & Instructions Dispensing Information Comments Morning Noon Evening Bedtime  
 ciclopirox 0.77 % topical cream  
Commonly known as:  Renelle  Your next dose is: Today, Tomorrow Other:  _________ Apply  to affected area two (2) times a day. Quantity:  30 g Refills:  0 LORazepam 1 mg tablet Commonly known as:  ATIVAN Your next dose is: Today, Tomorrow Other:  _________ TAKE ONE TABLET BY MOUTH ONCE DAILY AS NEEDED FOR ANXIETY Quantity:  5 Tab Refills:  0 PAXIL 20 mg tablet Generic drug:  PARoxetine Your next dose is: Today, Tomorrow Other:  _________ Take  by mouth two (2) times a day. Refills:  0  
     
   
   
   
  
 traMADol 50 mg tablet Commonly known as:  ULTRAM  
   
Your next dose is: Today, Tomorrow Other:  _________ Dose:  50 mg Take 1 Tab by mouth every six (6) hours as needed for Pain. Max Daily Amount: 200 mg. Quantity:  30 Tab Refills:  0  
     
   
   
   
  
 XANAX 0.5 mg tablet Generic drug:  ALPRAZolam  
   
Your next dose is: Today, Tomorrow Other:  _________ Take  by mouth. Refills:  0 Where to Get Your Medications Information on where to get these meds will be given to you by the nurse or doctor. ! Ask your nurse or doctor about these medications HYDROcodone-acetaminophen 5-325 mg per tablet Discharge Instructions Patient Discharge Instructions Angelita Gil / [id number] : 1985 Admitted 2017 Discharged: 2017 Take Home Medications · It is important that you take the medication exactly as they are prescribed. · Keep your medication in the bottles provided by the pharmacist and keep a list of the medication names, dosages, and times to be taken in your wallet. · Do not take other medications without consulting your doctor. What to do at [facility] Recommended diet: Regular Diet, Recommended activity: Activity as tolerated, may shower any time Follow-up Appointments Procedures  FOLLOW UP VISIT Appointment in: Two Weeks Standing Status:   Standing Number of Occurrences:   1 Order Specific Question:   Appointment in Answer: Two Weeks Information obtained by : 
I understand that if any problems occur once I am at home I am to contact my physician. I understand and acknowledge receipt of the instructions indicated above. Physician's or R.N.'s Signature                                                                  Date/Time Patient or Representative Signature                                                          Date/Time 
 
______________________________________________________________________ Anesthesia Discharge Instructions After general anesthesia or intervenous sedation, for 24 hours or while taking prescription Narcotics: · Limit your activities · Do not drive or operate hazardous machinery · If you have not urinated within 8 hours after discharge, please contact your surgeon on call. · Do not make important personal or business decisions · Do not drink alcoholic beverages Report the following to your surgeon: 
· Excessive pain, swelling, redness or odor of or around the surgical area · Temperature over 100.5 degrees · Nausea and vomiting lasting longer than 4 hours or if unable to take medication · Any signs of decreased circulation or nerve impairment to extremity:  Change in color, persistent numbness, tingling, coldness or increased pain. · Any questions Discharge Instructions Attachments/References CHOLECYSTECTOMY: POST-OP (ENGLISH) Discharge Orders None Introducing [facility] & HEALTH SERVICES! Dear Reg Harding: 
Thank you for requesting a MedPlasts account. Our records indicate that you already have an active MedPlasts account. You can access your account anytime at [email or web address] Did you know that you can access your hospital and ER discharge instructions at any time in MedPlasts? You can also review all of your test results from your hospital stay or ER visit. Additional Information If you have questions, please visit the Frequently Asked Questions section of the MedPlasts website at [email or web address] Remember, MedPlasts is NOT to be used for urgent needs. For medical emergencies, dial 911. Now available from your iPhone and Android! General Information Please provide this summary of care documentation to your next provider. Patient Signature:  ____________________________________________________________ Date:  ____________________________________________________________  
  
Azucena Beal Provider Signature:  ____________________________________________________________ Date:  ____________________________________________________________ More Information Cholecystectomy: What to Expect at [facility] Your Recovery After your surgery, it is normal to feel weak and tired for several days after you return home. Your belly may be swollen. If you had laparoscopic surgery, you may also have pain in your shoulder for about 24 hours. You may have gas or need to burp a lot at first, and a few people get diarrhea. The diarrhea usually goes away in 2 to 4 weeks, but it may last longer. How quickly you recover depends on whether you had a laparoscopic or open surgery. · For a laparoscopic surgery, most people can go back to work or their normal routine in 1 to 2 weeks, but it may take longer, depending on the type of work you do. · For an open surgery, it will probably take 4 to 6 weeks before you get back to your normal routine. This care sheet gives you a general idea about how long it will take for you to recover. However, each person recovers at a different pace. Follow the steps below to get better as quickly as possible. How can you care for yourself at home? Activity · Rest when you feel tired. Getting enough sleep will help you recover. · Try to walk each day. Start out by walking a little more than you did the day before. Gradually increase the amount you walk. Walking boosts blood flow and helps prevent pneumonia and constipation. · For about 2 to 4 weeks, avoid lifting anything that would make you strain. This may include a child, heavy grocery bags and milk containers, a heavy briefcase or backpack, cat litter or dog food bags, or a vacuum . · Avoid strenuous activities, such as biking, jogging, weightlifting, and aerobic exercise, until your doctor says it is okay. · You may shower 24 to 48 hours after surgery, if your doctor okays it. Pat the cut (incision) dry. Do not take a bath for the first 2 weeks, or until your doctor tells you it is okay. · You may drive when you are no longer taking pain medicine and can quickly move your foot from the gas pedal to the brake. You must also be able to sit comfortably for a long period of time, even if you do not plan to go far. You might get caught in traffic.  
· For a laparoscopic surgery, most people can go back to work or their normal routine in 1 to 2 weeks, but it may take longer. For an open surgery, it will probably take 4 to 6 weeks before you get back to your normal routine. · Your doctor will tell you when you can have sex again. Diet · Eat smaller meals more often instead of fewer larger meals. You can eat a normal diet, but avoid eating fatty foods for about 1 month. Fatty foods include hamburger, whole milk, cheese, and many snack foods. If your stomach is upset, try bland, low-fat foods like plain rice, broiled chicken, toast, and yogurt. · Drink plenty of fluids (unless your doctor tells you not to). · If you have diarrhea, try avoiding spicy foods, dairy products, fatty foods, and alcohol. You can also watch to see if specific foods cause it, and stop eating them. If the diarrhea continues for more than 2 weeks, talk to your doctor. · You may notice that your bowel movements are not regular right after your surgery. This is common. Try to avoid constipation and straining with bowel movements. You may want to take a fiber supplement every day. If you have not had a bowel movement after a couple of days, ask your doctor about taking a mild laxative. Medicines · Your doctor will tell you if and when you can restart your medicines. He or she will also give you instructions about taking any new medicines. · If you take blood thinners, such as warfarin (Coumadin), clopidogrel (Plavix), or aspirin, be sure to talk to your doctor. He or she will tell you if and when to start taking those medicines again. Make sure that you understand exactly what your doctor wants you to do. · Take pain medicines exactly as directed. ¨ If the doctor gave you a prescription medicine for pain, take it as prescribed. ¨ If you are not taking a prescription pain medicine, take an over-the-counter medicine such as acetaminophen (Tylenol), ibuprofen (Advil, Motrin), or naproxen (Aleve). Read and follow all instructions on the label. ¨ Do not take two or more pain medicines at the same time unless the doctor told you to. Many pain medicines contain acetaminophen, which is Tylenol. Too much Tylenol can be harmful. · If you think your pain medicine is making you sick to your stomach: 
¨ Take your medicine after meals (unless your doctor tells you not to). ¨ Ask your doctor for a different pain medicine. · If your doctor prescribed antibiotics, take them as directed. Do not stop taking them just because you feel better. You need to take the full course of antibiotics. Incision care · If you have strips of tape on the incision, or cut, leave the tape on for a week or until it falls off. · After 24 to 48 hours, wash the area daily with warm, soapy water, and pat it dry. · You may have staples to hold the cut together. Keep them dry until your doctor takes them out. This is usually in 7 to 10 days. · Keep the area clean and dry. You may cover it with a gauze bandage if it weeps or rubs against clothing. Change the bandage every day. Ice · To reduce swelling and pain, put ice or a cold pack on your belly for 10 to 20 minutes at a time. Do this every 1 to 2 hours. Put a thin cloth between the ice and your skin. Follow-up care is a key part of your treatment and safety. Be sure to make and go to all appointments, and call your doctor if you are having problems. It's also a good idea to know your test results and keep a list of the medicines you take. When should you call for help? Call 911 anytime you think you may need emergency care. For example, call if: 
· You passed out (lost consciousness). · You have severe trouble breathing. · You have sudden chest pain and shortness of breath, or you cough up blood. Call your doctor now or seek immediate medical care if: 
· You are sick to your stomach and cannot drink fluids. · You have pain that does not get better when you take your pain medicine. · You have signs of infection, such as: ¨ Increased pain, swelling, warmth, or redness. ¨ Red streaks leading from the incision. ¨ Pus draining from the incision. ¨ Swollen lymph nodes in your neck, armpits, or groin. ¨ A fever. · Your urine turns dark brown or your stool is light-colored or viki-colored. · Your skin or the whites of your eyes turn yellow. · Bright red blood has soaked through a large bandage over your incision. · You have signs of a blood clot, such as: 
¨ Pain in your calf, back of knee, thigh, or groin. ¨ Redness and swelling in your leg or groin. · You have trouble passing urine or stool, especially if you have mild pain or swelling in your lower belly. Watch closely for any changes in your health, and be sure to contact your doctor if: 
· You had a laparoscopic surgery and your shoulder pain lasts more than 24 hours. · You do not have a bowel movement after taking a laxative. Where can you learn more? Go to http://chris-isabel.info/. Enter [phone] in the search box to learn more about \"Cholecystectomy: What to Expect at Home. \" Current as of: August 9, 2016 Content Version: 11.1 © 1783-0115 [employer]. Care instructions adapted under license by My-wardrobe.com (which disclaims liability or warranty for this information). If you have questions about a medical condition or this instruction, always ask your healthcare professional. [place] [address] any warranty or liability for your use of this information.

## 2017-02-24 NOTE — BRIEF OP NOTE
BRIEF OPERATIVE NOTE    Date of Procedure: 2/24/2017   Preoperative Diagnosis: GALLSTONES  Postoperative Diagnosis: GALLSTONES    Procedure(s):  LAPAROSCOPIC CHOLECYSTECTOMY  Surgeon(s) and Role:     * Indira Zambrano MD - Primary            Surgical Staff:  Circ-1: Hiro Domingo RN  Circ-Relief: Silvia Arora RN  Scrub Tech-1: Ginny Hinton  Surg Asst-1: Christina Zelaya  Event Time In   Incision Start 1059   Incision Close      Anesthesia: General   Estimated Blood Loss: minimal  Specimens:   ID Type Source Tests Collected by Time Destination   1 : gall bladder Fresh Abdomen  Indira Zambrano MD 2/24/2017 1103 Pathology      Findings: stones   Complications: none  Implants: * No implants in log *    [id number]

## 2017-03-09 ENCOUNTER — OFFICE VISIT (OUTPATIENT)
Dept: SURGERY | Age: 32
End: 2017-03-09

## 2017-03-09 VITALS
SYSTOLIC BLOOD PRESSURE: 110 MMHG | OXYGEN SATURATION: 97 % | HEART RATE: 88 BPM | DIASTOLIC BLOOD PRESSURE: 80 MMHG | RESPIRATION RATE: 18 BRPM | HEIGHT: 68 IN | TEMPERATURE: 98.5 F | BODY MASS INDEX: 27.58 KG/M2 | WEIGHT: 182 LBS

## 2017-03-09 DIAGNOSIS — Z09 FOLLOW-UP EXAMINATION FOLLOWING SURGERY: Primary | ICD-10-CM

## 2017-03-09 NOTE — LETTER
NOTIFICATION OF RETURN TO WORK / SCHOOL 
 
3/9/2017 2:11 PM 
 
Mr. Genesis Abdalla 
[address] 
[address] [address] Abigail Santiago To Whom It May Concern: 
 
Genesis Abdalla was under the care of [facility] 137 506 from 2/24/17 to present. He will be able to return to work/school on 3/13/17 with regular duties and/or activities . If there are questions or concerns please have the patient contact our office.  
 
Sincerely, 
 
 
Octavio Farnsworth NP

## 2017-03-09 NOTE — PATIENT INSTRUCTIONS
May gradually resume your normal activities     Caution with heavy lifting, pushing or pulling and use proper body mechanics and or ask for assistance

## 2017-03-09 NOTE — PROGRESS NOTES
1. Have you been to the ER, urgent care clinic since your last visit? Hospitalized since your last visit? no    2. Have you seen or consulted any other health care providers outside of the [address] [place] [address] since your last visit? Include any pap smears or colon screening.  no

## 2017-03-09 NOTE — MR AVS SNAPSHOT
Visit Information Date & Time Provider Department Dept. Phone Encounter #  
 3/9/2017  2:00 PM Simon Engle NP [facility] 22 594 [phone] [id number] Your Appointments 3/13/2017 10:40 AM  
POST OP 10 MIN with Tl Coe NP  
[facility] 22 581 ([address]) Appt Note: po/2/24 Lap Viviana Romero card & ID  
 [address] 63 [facility] [address]  
[facility] Upcoming Health Maintenance Date Due DTaP/Tdap/Td series (2 - Td) 12/8/2019 Allergies as of 3/9/2017  Review Complete On: 3/9/2017 By: Tony Echevarria LPN No Known Allergies Current Immunizations  Reviewed on 9/21/2015 Name Date Hepatitis B Vaccine 12/8/2009 Influenza Vaccine 11/1/2013 Pneumococcal Vaccine (Unspecified Type) 12/8/2009 Rabies Vaccine 12/8/2009 TDAP Vaccine 12/8/2009 Not reviewed this visit Vitals BP Pulse Temp Resp Height(growth percentile) Weight(growth percentile) 110/80 88 98.5 °F (36.9 °C) (Oral) 18 5' 8\" (1.727 m) 182 lb (82.6 kg) SpO2 BMI Smoking Status 97% 27.67 kg/m2 Current Every Day Smoker BMI and BSA Data Body Mass Index Body Surface Area  
 27.67 kg/m 2 1.99 m 2 Preferred Pharmacy Pharmacy Name Phone [facility] [address], [address] [facility] [phone] Your Updated Medication List  
  
   
This list is accurate as of: 3/9/17  2:14 PM.  Always use your most recent med list.  
  
  
  
  
 ciclopirox 0.77 % topical cream  
Commonly known as:  Radha Majors Apply  to affected area two (2) times a day. LORazepam 1 mg tablet Commonly known as:  ATIVAN  
TAKE ONE TABLET BY MOUTH ONCE DAILY AS NEEDED FOR ANXIETY PAXIL 20 mg tablet Generic drug:  PARoxetine Take  by mouth two (2) times a day. XANAX 0.5 mg tablet Generic drug:  ALPRAZolam  
Take  by mouth. Patient Instructions May gradually resume your normal activities Caution with heavy lifting, pushing or pulling and use proper body mechanics and or ask for assistance Introducing \[facility]\"" & HEALTH SERVICES! Dear Genesis Miller: 
Thank you for requesting a KidsCash account. Our records indicate that you already have an active KidsCash account. You can access your account anytime at [email or web address] Did you know that you can access your hospital and ER discharge instructions at any time in KidsCash? You can also review all of your test results from your hospital stay or ER visit. Additional Information If you have questions, please visit the Frequently Asked Questions section of the KidsCash website at [email or web address] Remember, KidsCash is NOT to be used for urgent needs. For medical emergencies, dial 911. Now available from your iPhone and Android! Please provide this summary of care documentation to your next provider. Your primary care clinician is listed as Mary Beth Alanis. If you have any questions after today's visit, please call [phone].

## 2017-03-14 NOTE — PROGRESS NOTES
Chief Complaint   Patient presents with    Surgical Follow-up     2 weeks sp laparoscopic cholecystectomy by Dr. Renee Parker. Mary Fox is a 27 y.o. male presents 2 weeks s/p uncomplicated laparoscopic cholecystectomy. He is feeling well. No fever or chills, chest pain or shortness of breath. Voiding without difficulty and urine is clear yellow. Bowels moving daily and no diarrhea. Reports mother [place] and I was ok\". Some general soreness with activity, but getting better and off narcotic pain medications. Visit Vitals    /80    Pulse 88    Temp 98.5 °F (36.9 °C) (Oral)    Resp 18    Ht 5' 8\" (1.727 m)    Wt 182 lb (82.6 kg)    SpO2 97%    BMI 27.67 kg/m2     A + O x 3, appears well   Chest  CTA  COR  RRR  ABD Soft, lap sites are C/D/i and no erythema or induration, NT/ND, +BS, no masses or hernias. EXT No edema; ambulating independently      ICD-10-CM ICD-9-CM    1. Follow-up examination following surgery Z09 V67.00      2 weeks s/p uncomplicated laparoscopic cholecystectomy  Doing well   Reviewed pathology report   Gallbladder, cholecystectomy:   Cholelithiasis. Chronic cholecystitis. Diet as tolerated and cautioned with fatty / fried foods as may cause some GI distress / diarrhea   Activity gradually increase as tolerated and given note to RTW 3/13/17 regular duties   Discharged from surgical care with prn follow up   Mary Fxo verbalized understanding and questions were answered to the best of my knowledge and ability. Activity  educational materials were provided.

## 2017-06-02 ENCOUNTER — OFFICE VISIT (OUTPATIENT)
Dept: INTERNAL MEDICINE CLINIC | Age: 32
End: 2017-06-02

## 2017-06-02 VITALS
WEIGHT: 183 LBS | TEMPERATURE: 98.5 F | RESPIRATION RATE: 16 BRPM | OXYGEN SATURATION: 97 % | HEART RATE: 86 BPM | SYSTOLIC BLOOD PRESSURE: 110 MMHG | HEIGHT: 68 IN | BODY MASS INDEX: 27.74 KG/M2 | DIASTOLIC BLOOD PRESSURE: 70 MMHG

## 2017-06-02 DIAGNOSIS — J34.89 NASAL PAIN: ICD-10-CM

## 2017-06-02 DIAGNOSIS — J01.00 ACUTE NON-RECURRENT MAXILLARY SINUSITIS: Primary | ICD-10-CM

## 2017-06-02 RX ORDER — AMOXICILLIN AND CLAVULANATE POTASSIUM 875; 125 MG/1; MG/1
1 TABLET, FILM COATED ORAL EVERY 12 HOURS
Qty: 20 TAB | Refills: 0 | Status: SHIPPED | OUTPATIENT
Start: 2017-06-02 | End: 2017-06-12

## 2017-06-02 NOTE — PROGRESS NOTES
HPI:  Fely Mckeon is a 27y.o. year old male who returns to clinic today for an acute visit:   4 months of left sided nasal pain and Initially felt it was a pimple. Since then area has not healed and has on and off nasal discharge with blood. Has been having yellow/green nasal discharge recently. He has been using an otc nasal spray( unknown name) 5-6 x a day for the past week with no improvement. Current Outpatient Prescriptions   Medication Sig Dispense Refill    PARoxetine (PAXIL) 20 mg tablet Take  by mouth two (2) times a day.  LORazepam (ATIVAN) 1 mg tablet TAKE ONE TABLET BY MOUTH ONCE DAILY AS NEEDED FOR ANXIETY 5 Tab 0    ciclopirox (LOPROX) 0.77 % topical cream Apply  to affected area two (2) times a day. 30 g 0    ALPRAZolam (XANAX) 0.5 mg tablet Take  by mouth. No Known Allergies  Social History   Substance Use Topics    Smoking status: Current Every Day Smoker     Packs/day: 0.50     Years: 7.00     Types: Cigarettes    Smokeless tobacco: Never Used    Alcohol use No         Review of Systems   Constitutional: Negative for chills and fever. HENT: Negative for ear pain and sore throat. Respiratory: Negative for cough and shortness of breath. Cardiovascular: Negative for chest pain. Gastrointestinal: Negative for abdominal pain. Physical Exam   Constitutional: He appears well-developed. No distress. /70  Pulse 86  Temp 98.5 °F (36.9 °C)  Resp 16  Ht 5' 8\" (1.727 m)  Wt 183 lb (83 kg)  SpO2 97%  BMI 27.83 kg/m2   HENT:   Nose: Mucosal edema (and erythema. tender left nare. ) present. Left sinus exhibits maxillary sinus tenderness. Mouth/Throat: Oropharynx is clear and moist. No oropharyngeal exudate. Eyes: Conjunctivae are normal.   Neck: Neck supple. Pulmonary/Chest: Effort normal and breath sounds normal. He has no wheezes. He exhibits no tenderness. Lymphadenopathy:     He has no cervical adenopathy.          Assessment & Plan:    ICD-10-CM ICD-9-CM 1. Acute non-recurrent maxillary sinusitis J01.00 461.0    2. Nasal pain J34.89 478.19 REFERRAL TO ENT-OTOLARYNGOLOGY     Orders Placed This Encounter    REFERRAL TO ENT-OTOLARYNGOLOGY    amoxicillin-clavulanate (AUGMENTIN) 875-125 mg per tablet        Follow-up Disposition:  Return if symptoms worsen or fail to improve. Advised him to call back or return to office if symptoms worsen/change/persist.  Discussed expected course/resolution/complications of diagnosis in detail with patient. Medication risks/benefits/costs/interactions/alternatives discussed with patient. He was given an after visit summary which includes diagnoses, current medications, & vitals. He expressed understanding with the diagnosis and plan.

## 2017-09-08 ENCOUNTER — TELEPHONE (OUTPATIENT)
Dept: INTERNAL MEDICINE CLINIC | Age: 32
End: 2017-09-08

## 2017-10-16 LAB — CREATININE, EXTERNAL: 0.59

## 2017-10-17 ENCOUNTER — PATIENT OUTREACH (OUTPATIENT)
Dept: INTERNAL MEDICINE CLINIC | Age: 32
End: 2017-10-17

## 2017-10-17 NOTE — PROGRESS NOTES
[address] Follow Up for [facility] EMERGENCY [facility] CENTER Admission 10/14/17- 10/16/17 for persistent diarrhea and fever. Notes from Admission obtained and provided to Dr. Romeo Xavier to review. Called pt and states he is currently feeling better- is scheduled to be off from work today and tomorrow and is supposed to return on Thursday. He agreed to f/u appt with Dr. Romeo Xavier for Wednesday, 10/18/17. Med rec done- currently is not taking any antibiotics. Discussed considering taking a probiotic and he will review this with PCP. Goals        General     Health Maint (pt-stated)            10/16/17: Pt is not up to date with Flu Immunization- states that he has been getting sick more ever since he got one 3 years ago. He believes this is due to getting flu shot. Explained how the immunization is typically not a live vaccine unless he received it nasally. Pt will consider, but at this time declines. Dottie Bright RN, [facility]  Ambulatory Navigator, [facility] Internal [facility]         Hydration            10/16/17: Pt continues with loose stools, but no diarrhea. Is monitoring his temp and no fevers in last 24 hours. Reviewed ways to stay hydrated and when to call PCP. Red flags  Fever >100.4  Persistent Diarrhea/Vomiting         Post Hospitalization     Attends follow-up appointments as directed.             BEATA with Dr. Romeo Xavier on 10/18/17

## 2017-10-18 ENCOUNTER — OFFICE VISIT (OUTPATIENT)
Dept: INTERNAL MEDICINE CLINIC | Age: 32
End: 2017-10-18

## 2017-10-18 VITALS
OXYGEN SATURATION: 99 % | TEMPERATURE: 99.6 F | DIASTOLIC BLOOD PRESSURE: 66 MMHG | WEIGHT: 180 LBS | SYSTOLIC BLOOD PRESSURE: 102 MMHG | HEART RATE: 118 BPM | HEIGHT: 68 IN | BODY MASS INDEX: 27.28 KG/M2 | RESPIRATION RATE: 16 BRPM

## 2017-10-18 DIAGNOSIS — R79.89 ELEVATED LFTS: ICD-10-CM

## 2017-10-18 DIAGNOSIS — R73.9 ELEVATED BLOOD SUGAR: ICD-10-CM

## 2017-10-18 DIAGNOSIS — R55 PRE-SYNCOPE: ICD-10-CM

## 2017-10-18 DIAGNOSIS — R19.7 DIARRHEA, UNSPECIFIED TYPE: Primary | ICD-10-CM

## 2017-10-18 NOTE — PROGRESS NOTES
Bryson Mora is a 27 y.o. male who was seen in clinic today (10/18/2017). Assessment & Plan:  Diagnoses and all orders for this visit:    1. Diarrhea, unspecified type- resolved, reviewed w/u to date w/ patient, will continue to monitor, will f/u on any outstanding labs, to GI if continues. 2. Pre-syncope- this is a new problem, symptoms are: fluctuating, differential dx reviewed with the patient, sounds dehydration related (prolonged standing or heat in the shower). Will treat with: hydration and monitoring for symptoms. Red flags were reviewed with the patient to RTC or notify me, expected time course for resolution reviewed. EKG w/ sinus tach. -     AMB POC EKG ROUTINE W/ 12 LEADS, INTER & REP    3. Elevated LFTs- new dx, improving, will repeat labs in 1 week, likely related to viral illness, reviewed seeing hepatologist if no changes or symptoms above do not improve, w/u to date has been negative. -     HEPATIC FUNCTION PANEL    4. Elevated blood sugar- in the ER labs showed BS of 500+, ? If D5W, rest of BS in hospital high but not as bad, since repeating labs will check A1c.  -     HEMOGLOBIN A1C WITH EAG         Follow-up Disposition: Not on File       ----------------------------------------------------------------------    Subjective:  Indira Zambrano was seen today for [address] [facility] Follow Up  Bryson Mora is seen for follow up from recent admission to [facility] on 10/14/2017. We reviewed the the records. I did speak to discharge physical on the day of discharge. He presented with fevers and chills. He had presented to the ER three times over the previous 2 weeks w/ similar issues. He reports started after traveling back from [place] He reports returned home to get . He has filed for paperwork for his wife to come to [address]. He reports symptoms are mostly improved.   He reports almost 60% back to normal.  His only concerns is when he showers (warm water), he reports dim vision, dizziness, and buzzing in both ears. He has to lay down, w/i 5 minutes back to normal.  Only happens in the shower, never any other times. With showering he reports will occur 6-7 out of every 10 times. Prior to Admission medications    Medication Sig Start Date End Date Taking? Authorizing Provider   PARoxetine (PAXIL) 20 mg tablet Take  by mouth two (2) times a day. Yes Historical Provider   LORazepam (ATIVAN) 1 mg tablet TAKE ONE TABLET BY MOUTH ONCE DAILY AS NEEDED FOR ANXIETY 2/6/17  Yes Duong Leger MD   ciclopirox (LOPROX) 0.77 % topical cream Apply  to affected area two (2) times a day. 10/17/16  Yes Duong Leger MD          No Known Allergies        Review of Systems   HENT: Positive for tinnitus. Respiratory: Negative for cough and shortness of breath. Cardiovascular: Positive for palpitations (only w/ overactivity). Negative for chest pain. Gastrointestinal: Negative for abdominal pain, constipation, diarrhea, nausea and vomiting. Neurological: Positive for dizziness. Objective:   Physical Exam   Constitutional: No distress. Eyes: Conjunctivae are normal. No scleral icterus. Cardiovascular: Regular rhythm and normal heart sounds. Tachycardia present. No murmur heard. Pulmonary/Chest: Effort normal and breath sounds normal. He has no wheezes. He has no rales. Abdominal: Soft. He exhibits no mass. Bowel sounds are decreased. There is no hepatosplenomegaly. There is tenderness in the right lower quadrant, suprapubic area and left lower quadrant. There is no rigidity, no rebound and no guarding. Neurological: He has normal strength. No cranial nerve deficit or sensory deficit.          Visit Vitals    /66    Pulse (!) 118    Temp 99.6 °F (37.6 °C) (Oral)    Resp 16    Ht 5' 8\" (1.727 m)    Wt 180 lb (81.6 kg)    SpO2 99%    BMI 27.37 kg/m2         Disclaimer:  Advised him to call back or return to office if symptoms worsen/change/persist.  Discussed expected course/resolution/complications of diagnosis in detail with patient. Medication risks/benefits/costs/interactions/alternatives discussed with patient. He was given an after visit summary which includes diagnoses, current medications, & vitals. He expressed understanding with the diagnosis and plan.         Alonso Clement MD

## 2017-10-24 LAB
ALBUMIN SERPL-MCNC: 3.9 G/DL (ref 3.5–5.5)
ALP SERPL-CCNC: 226 IU/L (ref 39–117)
ALT SERPL-CCNC: 121 IU/L (ref 0–44)
AST SERPL-CCNC: 29 IU/L (ref 0–40)
BILIRUB DIRECT SERPL-MCNC: 0.1 MG/DL (ref 0–0.4)
BILIRUB SERPL-MCNC: 0.2 MG/DL (ref 0–1.2)
EST. AVERAGE GLUCOSE BLD GHB EST-MCNC: 111 MG/DL
HBA1C MFR BLD: 5.5 % (ref 4.8–5.6)
PROT SERPL-MCNC: 7.9 G/DL (ref 6–8.5)

## 2017-12-21 ENCOUNTER — OFFICE VISIT (OUTPATIENT)
Dept: INTERNAL MEDICINE CLINIC | Age: 32
End: 2017-12-21

## 2017-12-21 VITALS
DIASTOLIC BLOOD PRESSURE: 68 MMHG | BODY MASS INDEX: 28.79 KG/M2 | WEIGHT: 190 LBS | SYSTOLIC BLOOD PRESSURE: 108 MMHG | TEMPERATURE: 98.5 F | HEART RATE: 94 BPM | RESPIRATION RATE: 16 BRPM | HEIGHT: 68 IN | OXYGEN SATURATION: 98 %

## 2017-12-21 DIAGNOSIS — K40.90 INGUINAL HERNIA, RIGHT: Primary | ICD-10-CM

## 2017-12-21 NOTE — MR AVS SNAPSHOT
Visit Information Date & Time Provider Department Dept. Phone Encounter #  
 12/21/2017  4:15 PM Sherley Celis MD [address] [address] Internal Medicine [phone] [id number] Follow-up Instructions Return in about 6 weeks (around 2/1/2018) for Follow-up with Dr. Vaughn Schaffer. Your Appointments 12/22/2017 10:40 AM  
ESTABLISHED PATIENT with Gregorio Hunter NP  
[facility] [address] ([address]) Appt Note: EP; James Wyatt from Dr. Taylor Branch office set appt; pt has an inguinal hernia; [facility]; 12/21/17  
 [address] [facility] [address] [facility] [address]  
[address] [place] [address] Upcoming Health Maintenance Date Due DTaP/Tdap/Td series (2 - Td) 12/8/2019 Allergies as of 12/21/2017  Review Complete On: 12/21/2017 By: Sherley Celis MD  
 No Known Allergies Current Immunizations  Reviewed on 10/18/2017 Name Date Hepatitis B Vaccine 12/8/2009 Influenza Vaccine 11/1/2013 Rabies Vaccine 12/8/2009 TDAP Vaccine 12/8/2009 ZZZ-RETIRED (DO NOT USE) Pneumococcal Vaccine (Unspecified Type) 12/8/2009 Not reviewed this visit You Were Diagnosed With   
  
 Codes Comments Inguinal hernia, right    -  Primary ICD-10-CM: K40.90 ICD-9-CM: 550.90 Vitals BP Pulse Temp Resp Height(growth percentile) Weight(growth percentile) 108/68 (BP 1 Location: Right arm, BP Patient Position: Sitting) 94 98.5 °F (36.9 °C) (Oral) 16 5' 8\" (1.727 m) 190 lb (86.2 kg) SpO2 BMI Smoking Status 98% 28.89 kg/m2 Current Every Day Smoker Vitals History BMI and BSA Data Body Mass Index Body Surface Area  
 28.89 kg/m 2 2.03 m 2 Preferred Pharmacy Pharmacy Name Phone [facility] [address] [phone] Your Updated Medication List  
  
   
 This list is accurate as of: 12/21/17  4:55 PM.  Always use your most recent med list.  
  
  
  
  
 ciclopirox 0.77 % topical cream  
Commonly known as:  Peggyann Salts Apply  to affected area two (2) times a day. LORazepam 1 mg tablet Commonly known as:  ATIVAN  
TAKE ONE TABLET BY MOUTH ONCE DAILY AS NEEDED FOR ANXIETY PAXIL 20 mg tablet Generic drug:  PARoxetine Take  by mouth two (2) times a day. We Performed the Following REFERRAL TO GENERAL SURGERY [REF27 Custom] Follow-up Instructions Return in about 6 weeks (around 2/1/2018) for Follow-up with Dr. Angela Li. Referral Information Referral ID Referred By Referred To  
  
 [id number] YULIET KEITH MD   
   [address] [place] [address] [address] [place] [address], [address] Phone: [phone] Fax: [phone] Visits Status Start Date End Date 1 New Request 12/21/17 12/21/18 If your referral has a status of pending review or denied, additional information will be sent to support the outcome of this decision. Patient Instructions It was a pleasure to see you! As discussed: 
 
See The Surgery Department on 12/22/17 for evaluation. If you have any of the warning symptoms we discussed. Inguinal Hernia: Care Instructions Your Care Instructions An inguinal hernia occurs when tissue bulges through a weak spot in your groin area. You may see or feel a tender bulge in the groin or scrotum. You may also have pain, pressure or burning, or a feeling that something has \"given way. \" Hernias are caused by a weakness in the belly wall. The bulge or discomfort may occur after heavy lifting, straining, or coughing. Hernias do not heal on their own, and they tend to get worse over time. If your hernia does not bother you, you most likely can wait to have surgery. Your hernia may get worse, but it may not.  In some cases, hernias that are small and painless may never need to be repaired. Follow-up care is a key part of your treatment and safety. Be sure to make and go to all appointments, and call your doctor if you are having problems. It's also a good idea to know your test results and keep a list of the medicines you take. How can you care for yourself at home? · Take pain medicines exactly as directed. ¨ If the doctor gave you a prescription medicine for pain, take it as prescribed. ¨ If you are not taking a prescription pain medicine, ask your doctor if you can take an over-the-counter medicine. · Use proper lifting techniques, and avoid heavy lifting if you can. To lift things more safely, bend your knees and let your arms and legs do the work. Keep your back straight, and do not bend over at the waist. Keep the load as close to your body as you can. Move your feet instead of turning or twisting your body. · Lose weight if you are overweight. · Include fruits, vegetables, legumes, and whole grains in your diet each day. These foods are high in fiber and will make it easier to avoid straining during bowel movements. · Do not smoke. Smoking can cause coughing, which can cause your hernia to bulge. If you need help quitting, talk to your doctor about stop-smoking programs and medicines. These can increase your chances of quitting for good. When should you call for help? Call your doctor now or seek immediate medical care if: 
? · You have new or worse belly pain. ? · You are vomiting. ? · You cannot pass stools or gas. ? · You cannot push the hernia back into place with gentle pressure when you are lying down. ? · The area over the hernia turns red or becomes tender. ? Watch closely for changes in your health, and be sure to contact your doctor if you have any problems. Where can you learn more? Go to http://chris-isabel.info/. Enter J055 in the search box to learn more about \"Inguinal Hernia: Care Instructions. \" Current as of: May 12, 2017 Content Version: 11.4 © 7767-7692 Spodly. Care instructions adapted under license by ApiFix (which disclaims liability or warranty for this information). If you have questions about a medical condition or this instruction, always ask your healthcare professional. [address] any warranty or liability for your use of this information. Introducing [facility]! Dear Norman Pacheco: 
Thank you for requesting a InsideSales.com account. Our records indicate that you already have an active [email or web address] account. You can access your account anytime at [email or web address] Did you know that you can access your hospital and ER discharge instructions at any time in [email or web address]? You can also review all of your test results from your hospital stay or ER visit. Additional Information If you have questions, please visit the Frequently Asked Questions section of the [email or web address] website at [email or web address] Remember, InsideSales.com is NOT to be used for urgent needs. For medical emergencies, dial 911. Now available from your iPhone and Android! Please provide this summary of care documentation to your next provider. Your primary care clinician is listed as Oneta Faes. If you have any questions after today's visit, please call [phone].

## 2017-12-21 NOTE — PROGRESS NOTES
HISTORY OF PRESENT ILLNESS  Stephanie Vázquez is a 34 y.o. male. Groin Pain   This is a new problem. The current episode started more than 1 week ago. The problem has been gradually worsening. Pertinent negatives include no chest pain. Associated symptoms comments: Right groin pain radiating to right testicle. Denies dysuria, fevers, chills, constipation . The symptoms are aggravated by bending. The symptoms are relieved by medications. The treatment provided mild relief. Review of Systems   Constitutional: Negative for chills and fever. Cardiovascular: Negative for chest pain and palpitations. Gastrointestinal: Positive for constipation. Genitourinary: Negative for dysuria, flank pain, frequency, hematuria and urgency. Per HPI   Musculoskeletal: Positive for back pain. Psychiatric/Behavioral: The patient is not nervous/anxious. Patient Active Problem List    Diagnosis Date Noted    Symptomatic cholelithiasis 02/15/2017    Tobacco use 05/27/2016    Vasovagal syncope 07/31/2015    Depression 11/07/2012    Anxiety 11/07/2012       Current Outpatient Prescriptions   Medication Sig Dispense Refill    PARoxetine (PAXIL) 20 mg tablet Take  by mouth two (2) times a day.  LORazepam (ATIVAN) 1 mg tablet TAKE ONE TABLET BY MOUTH ONCE DAILY AS NEEDED FOR ANXIETY 5 Tab 0    ciclopirox (LOPROX) 0.77 % topical cream Apply  to affected area two (2) times a day. 30 g 0       No Known Allergies   Visit Vitals    /68 (BP 1 Location: Right arm, BP Patient Position: Sitting)    Pulse 94    Temp 98.5 °F (36.9 °C) (Oral)    Resp 16    Ht 5' 8\" (1.727 m)    Wt 190 lb (86.2 kg)    SpO2 98%    BMI 28.89 kg/m2       Physical Exam   Constitutional: He is oriented to person, place, and time. No distress. Abdominal: A hernia is present. Hernia confirmed positive in the right inguinal area. Hernia confirmed negative in the left inguinal area.    Genitourinary: Testes normal and penis normal. Cremasteric reflex is present. Neurological: He is alert and oriented to person, place, and time. Psychiatric: He has a normal mood and affect. Nurse chaperone present   [address] and PLAN  Diagnoses and all orders for this visit:    1. Inguinal hernia, right- Ivanna Villalta present with 10 days of worsening hernia. Unable to reduce in office today due to pain. No overlying skin changes to suggest strangulation. However he is at high risk given his signs and symptoms. He is reluctant to have a surgical evaluation because of family members who he reports had impotence after hernia surgery. We had a lengthy discussion about the risks versus benefits of untreated hernias especially given his current symptoms and he agreed to see general surgery for evaluation in the a.m. Appointment was scheduled for 10 AM tomorrow. Work form completed for lifting precautions. Red flags to warrant ER or earlier clinical evaluation reviewed. -     [facility] General Surgery ref [facility]      Follow-up Disposition:  Return in about 6 weeks (around 2/1/2018) for Follow-up with Dr. Medardo Arias. Medication risks/benefits/costs/interactions/alternatives discussed with patient. Ivanna Villalta  was given an after visit summary which includes diagnoses, current medications, & vitals. he expressed understanding with the diagnosis and plan.     20 minutes of 25 minutes of care coordination was spent counseling patient on treatment plan and assessing understanding

## 2017-12-21 NOTE — PATIENT INSTRUCTIONS
It was a pleasure to see you! As discussed:    See The Surgery Department on 12/22/17 for evaluation. If you have any of the warning symptoms we discussed. Inguinal Hernia: Care Instructions  Your Care Instructions    An inguinal hernia occurs when tissue bulges through a weak spot in your groin area. You may see or feel a tender bulge in the groin or scrotum. You may also have pain, pressure or burning, or a feeling that something has \"given way. \"  Hernias are caused by a weakness in the belly wall. The bulge or discomfort may occur after heavy lifting, straining, or coughing. Hernias do not heal on their own, and they tend to get worse over time. If your hernia does not bother you, you most likely can wait to have surgery. Your hernia may get worse, but it may not. In some cases, hernias that are small and painless may never need to be repaired. Follow-up care is a key part of your treatment and safety. Be sure to make and go to all appointments, and call your doctor if you are having problems. It's also a good idea to know your test results and keep a list of the medicines you take. How can you care for yourself at home? · Take pain medicines exactly as directed. ¨ If the doctor gave you a prescription medicine for pain, take it as prescribed. ¨ If you are not taking a prescription pain medicine, ask your doctor if you can take an over-the-counter medicine. · Use proper lifting techniques, and avoid heavy lifting if you can. To lift things more safely, bend your knees and let your arms and legs do the work. Keep your back straight, and do not bend over at the waist. Keep the load as close to your body as you can. Move your feet instead of turning or twisting your body. · Lose weight if you are overweight. · Include fruits, vegetables, legumes, and whole grains in your diet each day. These foods are high in fiber and will make it easier to avoid straining during bowel movements. · Do not smoke. Smoking can cause coughing, which can cause your hernia to bulge. If you need help quitting, talk to your doctor about stop-smoking programs and medicines. These can increase your chances of quitting for good. When should you call for help? Call your doctor now or seek immediate medical care if:  ? · You have new or worse belly pain. ? · You are vomiting. ? · You cannot pass stools or gas. ? · You cannot push the hernia back into place with gentle pressure when you are lying down. ? · The area over the hernia turns red or becomes tender. ? Watch closely for changes in your health, and be sure to contact your doctor if you have any problems. Where can you learn more? Go to http://chris-isabel.info/. Enter Y981 in the search box to learn more about \"Inguinal Hernia: Care Instructions. \"  Current as of: May 12, 2017  Content Version: 11.4  © 6247-0433 GOkey. Care instructions adapted under license by Voovio aka 3Ditize (which disclaims liability or warranty for this information). If you have questions about a medical condition or this instruction, always ask your healthcare professional. [place] [address] any warranty or liability for your use of this information.

## 2017-12-21 NOTE — PROGRESS NOTES
Chief Complaint   Patient presents with    Groin Pain     1. Have you been to the ER, urgent care clinic since your last visit? Hospitalized since your last visit? No    2. Have you seen or consulted any other health care providers outside of the [address] [place] [address] since your last visit? Include any pap smears or colon screening. No    Patient stated picked up box, right side groin pain. Back radiating down leg.  Right testicle tender

## 2017-12-22 ENCOUNTER — OFFICE VISIT (OUTPATIENT)
Dept: SURGERY | Age: 32
End: 2017-12-22

## 2017-12-22 VITALS
RESPIRATION RATE: 16 BRPM | SYSTOLIC BLOOD PRESSURE: 108 MMHG | TEMPERATURE: 98.2 F | OXYGEN SATURATION: 98 % | WEIGHT: 185.5 LBS | BODY MASS INDEX: 28.11 KG/M2 | HEIGHT: 68 IN | DIASTOLIC BLOOD PRESSURE: 60 MMHG | HEART RATE: 62 BPM

## 2017-12-22 DIAGNOSIS — R10.31 RIGHT INGUINAL PAIN: Primary | ICD-10-CM

## 2017-12-22 NOTE — PROGRESS NOTES
Surgical Consultation     Pema Ken is a 34 y.o. male was referred by Sunita Sutton,* for evaluation of groin pain. Symptoms were first noted 1 week ago. Pain was sharp, radiating to groin and occurred when the patient was doing heavy lifting. He denies seeing any mass or bulge. Patient is no longer experiencing groin pain. He does not have a history of incarceration or obstruction. Contributing symptoms - Patient does not have chronic constipation, chronic cough, difficulty urinating. Patient does not have a history of  previous hernia surgery. He confirms everyday smoking. Patient Active Problem List    Diagnosis Date Noted    Symptomatic cholelithiasis 02/15/2017    Tobacco use 05/27/2016    Vasovagal syncope 07/31/2015    Depression 11/07/2012    Anxiety 11/07/2012     Current Outpatient Prescriptions   Medication Sig Dispense Refill    ALPRAZOLAM (XANAX PO) Take  by mouth.  PARoxetine (PAXIL) 20 mg tablet Take  by mouth two (2) times a day.  LORazepam (ATIVAN) 1 mg tablet TAKE ONE TABLET BY MOUTH ONCE DAILY AS NEEDED FOR ANXIETY 5 Tab 0    ciclopirox (LOPROX) 0.77 % topical cream Apply  to affected area two (2) times a day. 30 g 0     No Known Allergies  Past Medical History:   Diagnosis Date    Anxiety     Depression     Positive test for herpes simplex virus (HSV) antibody 2015    Type 1 & 2    Symptomatic cholelithiasis 2/15/2017    Tobacco use 5/27/2016    Vasovagal syncope 7/31/2015    Se[facility] ED in [facility], thought to be seizure due to Wellbutrin. Did see neurology (Dr Lj Walker).   Thought to be syncope      Past Surgical History:   Procedure Laterality Date    HX LAP CHOLECYSTECTOMY  02/24/2017    by Dr. Clyde Santizo     Family History   Problem Relation Age of Onset    Thyroid Disease Mother      hypothyroid    No Known Problems Father     No Known Problems Sister     No Known Problems Sister     No Known Problems Sister     No Known Problems Sister     Seizures Paternal Aunt      Social History   Substance Use Topics    Smoking status: Current Every Day Smoker     Packs/day: 0.50     Years: 7.00     Types: Cigarettes    Smokeless tobacco: Never Used    Alcohol use No        Review of Systems: deferred (Hemalatha down)       Objective:     Visit Vitals    /60 (BP 1 Location: Right arm, BP Patient Position: Sitting)    Pulse 62    Temp 98.2 °F (36.8 °C) (Oral)    Resp 16    Ht 5' 8\" (1.727 m)    Wt 185 lb 8 oz (84.1 kg)    SpO2 98%    BMI 28.21 kg/m2        Physical Exam:    General:  alert, cooperative, no distress, appears stated age   Abdomen:   no inguinal or femoral hernias noted  Right inguinal area was moderately tender with no bulge    (male): TESTICULAR EXAM: normal, no masses,   PENIS: normal without lesions or discharge,   SCROTUM: normal, no masses       Assessment:   Diagnoses and all orders for this visit:    1. Right inguinal pain        No hernias were palpated. Groin pain was likely caused by straining while performing heavy lifting. Recommendation:     1. Follow up prn.        11:57 AM - 12:05 PM   Total time spent with patient, greater than 50% of the time was spent in counselin minutes.     Signed By: Uriel Villaseñor MD    2017      Cc: Serenity Coelho,*

## 2017-12-22 NOTE — PROGRESS NOTES
1. Have you been to the ER, urgent care clinic since your last visit? Hospitalized since your last visit? No    2. Have you seen or consulted any other health care providers outside of the [address] [place] [address] since your last visit? Include any pap smears or colon screening.  No

## 2017-12-22 NOTE — LETTER
12/22/2017 12:08 PM 
 
Patient:  Ivanna Villalta YOB: 1985 Date of Visit: 12/22/2017 Dear Tricia Malhotra MD 
[address] [place] [address] Dr Sy [phone] VIA In Basket 
 : Thank you for referring Mr. Ivanna Villalta to me for evaluation/treatment. Below are the relevant portions of my assessment and plan of care. Assessment:  
Diagnoses and all orders for this visit: 1. Right inguinal pain No hernias were palpated. Groin pain was likely caused by straining while performing heavy lifting. Recommendation: 1. Follow up prn. If you have questions, please do not hesitate to call me. I look forward to following Mr. Edson Fan along with you. Sincerely, Pete Roche MD

## 2017-12-22 NOTE — COMMUNICATION BODY
Assessment:   Diagnoses and all orders for this visit:    1. Right inguinal pain        No hernias were palpated. Groin pain was likely caused by straining while performing heavy lifting. Recommendation:     1. Follow up prn.

## 2017-12-22 NOTE — LETTER
NOTIFICATION OF RETURN TO WORK 
 
12/22/2017 12:03 PM 
 
Mr. Mikc Serrano [facility] [address] [address] To Whom It May Concern: 
 
Bipinfelipa Dee was seen today at [address] [place] [address]. He will be able to return to work with no restrictions. If there are questions or concerns please have the patient contact our office. Sincerely, Ernesto Willingham MD

## 2017-12-22 NOTE — LETTER
12/22/2017 12:01 PM 
 
Mr. Mick Serrano [facility] [address] To whom it may concern: 
Mr. Obinna Tobin was seen today in our office for consultation. Please excuse his absence. Sincerely, Gloria Osborne MD

## 2019-08-12 NOTE — ROUTINE PROCESS
Patient: Magdalene Prasad MRN: [id number]  SSN: [id number]   YOB: 1985  Age: 27 y.o. Sex: male     Patient is status post Procedure(s):  LAPAROSCOPIC CHOLECYSTECTOMY. Surgeon(s) and Role:     * Lory Coy MD - Primary    Local/Dose/Irrigation:  Marcaine 0.5% with Epi- 20 ml                  Peripheral IV 02/24/17 Right Hand (Active)   Site Assessment Clean, dry, & intact 2/24/2017 10:10 AM   Phlebitis Assessment 0 2/24/2017 10:10 AM   Infiltration Assessment 0 2/24/2017 10:10 AM   Dressing Status New 2/24/2017 10:10 AM   Dressing Type Transparent 2/24/2017 10:10 AM   Hub Color/Line Status Green; Infusing 2/24/2017 10:10 AM            Airway - Endotracheal Tube 02/24/17 Oral (Active)                   Dressing/Packing:  Wound Abdomen Anterior-DRESSING TYPE: Topical skin adhesive/glue (02/24/17 1118)  Splint/Cast:  ]    Other:  Trocar sites x 4 Silvestre with DARIEL Armando. 89 yo male, from [facility] [place], phx gout, edema, atrial fib, htn, dementia, BPH, comes to the ED co lower extremity swelling . Pt has a hx of LE swelling, has visiting nurse for wound care weekly at the [facility] who advised him to come to the ED today due to increased swelling. Pt denies any pain or sensation changes to the lower extremities.  Denies any fevers or chills. Admits to more frequent fluid filled blisters over the past few days. Exam impressive for advancing cellulitis. Will rquire IV antibiotics for cellulitis.   I performed a face to face bedside interview with patient regarding history of present illness, review of symptoms and past medical history. I completed an independent physical exam.  I have discussed the patient's plan of care with Physician Assistant (PA). I agree with note as stated above, having amended the EMR as needed to reflect my findings.   This includes History of Present Illness, HIV, Past Medical/Surgical/Family/Social History, Allergies and Home Medications, Review of Systems, Physical Exam, and any Progress Notes during the time I functioned as the attending physician for this patient.

## 2019-09-30 ENCOUNTER — OFFICE VISIT (OUTPATIENT)
Dept: INTERNAL MEDICINE CLINIC | Age: 34
End: 2019-09-30

## 2019-09-30 VITALS
BODY MASS INDEX: 27.43 KG/M2 | OXYGEN SATURATION: 97 % | WEIGHT: 181 LBS | RESPIRATION RATE: 16 BRPM | SYSTOLIC BLOOD PRESSURE: 98 MMHG | DIASTOLIC BLOOD PRESSURE: 64 MMHG | HEART RATE: 96 BPM | TEMPERATURE: 99 F | HEIGHT: 68 IN

## 2019-09-30 DIAGNOSIS — F41.9 ANXIETY: ICD-10-CM

## 2019-09-30 DIAGNOSIS — Z72.0 TOBACCO USE: ICD-10-CM

## 2019-09-30 DIAGNOSIS — F33.9 RECURRENT MAJOR DEPRESSIVE DISORDER, REMISSION STATUS UNSPECIFIED (HCC): ICD-10-CM

## 2019-09-30 DIAGNOSIS — R53.83 FATIGUE, UNSPECIFIED TYPE: ICD-10-CM

## 2019-09-30 DIAGNOSIS — J01.41 ACUTE RECURRENT PANSINUSITIS: ICD-10-CM

## 2019-09-30 DIAGNOSIS — J20.9 BRONCHOSPASM WITH BRONCHITIS, ACUTE: Primary | ICD-10-CM

## 2019-09-30 PROBLEM — K80.20 SYMPTOMATIC CHOLELITHIASIS: Status: RESOLVED | Noted: 2017-02-15 | Resolved: 2019-09-30

## 2019-09-30 LAB
FLUAV+FLUBV AG NOSE QL IA.RAPID: NEGATIVE POS/NEG
FLUAV+FLUBV AG NOSE QL IA.RAPID: NEGATIVE POS/NEG
VALID INTERNAL CONTROL?: YES

## 2019-09-30 RX ORDER — METHYLPREDNISOLONE 4 MG/1
TABLET ORAL
Qty: 1 DOSE PACK | Refills: 0 | Status: SHIPPED | OUTPATIENT
Start: 2019-09-30 | End: 2019-10-06

## 2019-09-30 RX ORDER — DOXYCYCLINE 100 MG/1
100 CAPSULE ORAL 2 TIMES DAILY
Qty: 14 CAP | Refills: 0 | Status: SHIPPED | OUTPATIENT
Start: 2019-09-30 | End: 2019-10-07

## 2019-09-30 NOTE — PROGRESS NOTES
Cough, congestion, sore throat for past 2 weeks. Felt feverish over the weekend. Joint/muscle pain. Has tried OTC products Dayquil/Nyquil. Feeling weak past month.

## 2019-09-30 NOTE — PATIENT INSTRUCTIONS
Sinusitis: Care Instructions Your Care Instructions Sinusitis is an infection of the lining of the sinus cavities in your head. Sinusitis often follows a cold. It causes pain and pressure in your head and face. In most cases, sinusitis gets better on its own in 1 to 2 weeks. But some mild symptoms may last for several weeks. Sometimes antibiotics are needed. Follow-up care is a key part of your treatment and safety. Be sure to make and go to all appointments, and call your doctor if you are having problems. It's also a good idea to know your test results and keep a list of the medicines you take. How can you care for yourself at home? · Take an over-the-counter pain medicine, such as acetaminophen (Tylenol), ibuprofen (Advil, Motrin), or naproxen (Aleve). Read and follow all instructions on the label. · If the doctor prescribed antibiotics, take them as directed. Do not stop taking them just because you feel better. You need to take the full course of antibiotics. · Be careful when taking over-the-counter cold or flu medicines and Tylenol at the same time. Many of these medicines have acetaminophen, which is Tylenol. Read the labels to make sure that you are not taking more than the recommended dose. Too much acetaminophen (Tylenol) can be harmful. · Breathe warm, moist air from a steamy shower, a hot bath, or a sink filled with hot water. Avoid cold, dry air. Using a humidifier in your home may help. Follow the directions for cleaning the machine. · Use saline (saltwater) nasal washes to help keep your nasal passages open and wash out mucus and bacteria. You can buy saline nose drops at a grocery store or drugstore. Or you can make your own at home by adding 1 teaspoon of salt and 1 teaspoon of baking soda to 2 cups of distilled water. If you make your own, fill a bulb syringe with the solution, insert the tip into your nostril, and squeeze gently. [place] your nose. · Put a hot, wet towel or a warm gel pack on your face 3 or 4 times a day for 5 to 10 minutes each time. · Try a decongestant nasal spray like oxymetazoline (Afrin). Do not use it for more than 3 days in a row. Using it for more than 3 days can make your congestion worse. When should you call for help? Call your doctor now or seek immediate medical care if: 
  · You have new or worse swelling or redness in your face or around your eyes.  
  · You have a new or higher fever.  
 Watch closely for changes in your health, and be sure to contact your doctor if: 
  · You have new or worse facial pain.  
  · The mucus from your nose becomes thicker (like pus) or has new blood in it.  
  · You are not getting better as expected. Where can you learn more? Go to http://chris-isabel.info/. Enter S440 in the search box to learn more about \"Sinusitis: Care Instructions. \" Current as of: October 21, 2018 Content Version: 12.2 © 8970-3878 [employer]. Care instructions adapted under license by [employer] (which disclaims liability or warranty for this information). If you have questions about a medical condition or this instruction, always ask your healthcare professional. [address] any warranty or liability for your use of this information.

## 2019-09-30 NOTE — PROGRESS NOTES
Doristine Ganser is a 28 y.o. male who was seen in clinic today (9/30/2019) for an acute visit. Last seen in Oct '17. Since then has a new job, has a new wife, and has a new child. Assessment & Plan:     Diagnoses and all orders for this visit:    1. Bronchospasm with bronchitis, acute- this is a new problem, symptoms are: not improving, differential dx reviewed with the patient, favor infectious and due to duration likely bacterial.  Will treat with: meds below. Red flags were reviewed with the patient to RTC or notify me, expected time course for resolution reviewed. Reviewed when to consider imaging. Flu test negative. -     AMB POC TRINITY INFLUENZA A/B TEST  -     doxycycline (MONODOX) 100 mg capsule; Take 1 Cap by mouth two (2) times a day for 7 days. -     albuterol sulfate (PROAIR RESPICLICK) 90 mcg/actuation aepb; Take 1-2 Puffs by inhalation every four (4) hours as needed for Cough (or SOB). -     methylPREDNISolone (MEDROL DOSEPACK) 4 mg tablet; use as directed    2. Acute recurrent pansinusitis- symptoms: fluctuating but not improving, discussed differential diagnosis and at this time favor a bacterial etiology. Treatment options reviewed, including prescription & OTC options. Will start meds below. Red flags were reviewed, expected time course for resolution was discussed, and when to RTC or notify me of changes was discussed with him. -     AMB POC TRINITY INFLUENZA A/B TEST  -     doxycycline (MONODOX) 100 mg capsule; Take 1 Cap by mouth two (2) times a day for 7 days. 3. Fatigue, unspecified type- this is a new problem, symptoms are: fluctuating several times per day, differential dx reviewed with the patient, exact etiology is unclear at this time. Will start w/ labs below. We reviewed if normal need to revisit his sleep and his mood. Red flags were reviewed with the patient to RTC or notify me.      -     METABOLIC PANEL, COMPREHENSIVE  -     CBC WITH AUTOMATED DIFF  -     TSH 3RD GENERATION    4. Tobacco use- The patient was counseled on the dangers of tobacco use, and was advised to quit and reluctant to quit. Reviewed strategies to maximize success, including removing cigarettes and smoking materials from environment, stress management, substitution of other forms of reinforcement and support of family/friends. He has cut down. Did review having motivation to quit (son). 5. Recurrent major depressive disorder, remission status unspecified ([facility] [address]- chronic issue, managed by specialist, unclear control, I reviewed treatment options with him, I reviewed life style changes to help improve mood, continue current treatment. If no obvious cause of fatigue need to revisit mood. Will defer to specialist     6. Anxiety- chronic issue, managed by specialist, sounds well controlled, I reviewed treatment options with him, I reviewed life style changes to help improve mood, continue current treatment. Will defer to specialist            Follow-up and Dispositions    · Return if symptoms worsen or fail to improve. Subjective:   Mitchell Retana was seen today for Cold Symptoms    URI Review  Fuat returns to clinic today to talk about: URI symptoms for 2 weeks ago, which are unchanged since that time. He reports bilateral ear fullness on/off, sinus congestion, post nasal drip, rhinorrhea, sore throat, productive cough, chest congestion, myalgias and joint aches. Treatments have included: nyquil & dayquil which have been not very effective. Relevant PMH: tobacco use. Patient reports sick contacts: yes - multiple, works at [employer]. Fatigue  Patient complains of fatigue. Symptoms began 4 months. He reports getting worse. He reports at times he feels full of energy and then suddenly will have no energy. These episodes last for a few hours, then he will feel better for a bit, and then it returns.   This is occurring several times in the day, worse in AM.  He reports it is hard function in the morning. He reports sleeping well. He does not feel well rested. He reports mood is doing well. Prior to Admission medications    Medication Sig Start Date End Date Taking? Authorizing Provider   PARoxetine (PAXIL) 20 mg tablet Take 20 mg by mouth two (2) times a day. Yes Provider, Historical   LORazepam (ATIVAN) 1 mg tablet TAKE ONE TABLET BY MOUTH ONCE DAILY AS NEEDED FOR ANXIETY 2/6/17   Gerson Queen MD          No Known Allergies        Review of Systems   Gastrointestinal: Negative for abdominal pain, constipation, diarrhea, nausea and vomiting. Musculoskeletal: Positive for joint pain and myalgias. Objective:   Physical Exam   Constitutional: No distress. HENT:   Right Ear: Tympanic membrane is not erythematous and not bulging. No middle ear effusion. Left Ear: Tympanic membrane is not erythematous and not bulging. No middle ear effusion. Nose: No mucosal edema or rhinorrhea. Right sinus exhibits no maxillary sinus tenderness and no frontal sinus tenderness. Left sinus exhibits no maxillary sinus tenderness and no frontal sinus tenderness. Mouth/Throat: Uvula is midline and mucous membranes are normal. Posterior oropharyngeal erythema present. No oropharyngeal exudate. Eyes: Conjunctivae are normal. No scleral icterus. Neck: Neck supple. Cardiovascular: Regular rhythm and normal heart sounds. No murmur heard. Pulmonary/Chest: Effort normal and breath sounds normal. He has no wheezes. He has no rales. Abdominal: Soft. Bowel sounds are normal. He exhibits no mass. There is no hepatosplenomegaly. There is no tenderness. Lymphadenopathy:     He has no cervical adenopathy.          Visit Vitals  BP 98/64   Pulse 96   Temp 99 °F (37.2 °C) (Oral)   Resp 16   Ht 5' 8\" (1.727 m)   Wt 181 lb (82.1 kg)   SpO2 97%   BMI 27.52 kg/m²         Disclaimer:  Advised him to call back or return to office if symptoms worsen/change/persist.  Discussed expected course/resolution/complications of diagnosis in detail with patient. Medication risks/benefits/costs/interactions/alternatives discussed with patient. He was given an after visit summary which includes diagnoses, current medications, & vitals. He expressed understanding with the diagnosis and plan. Aspects of this note may have been generated using voice recognition software. Despite editing, there may be some syntax errors.        Chris Castillo MD

## 2019-10-02 LAB
ALBUMIN SERPL-MCNC: 4.6 G/DL (ref 3.5–5.5)
ALBUMIN/GLOB SERPL: 1.8 {RATIO} (ref 1.2–2.2)
ALP SERPL-CCNC: 110 IU/L (ref 39–117)
ALT SERPL-CCNC: 31 IU/L (ref 0–44)
AST SERPL-CCNC: 23 IU/L (ref 0–40)
BASOPHILS # BLD AUTO: 0 X10E3/UL (ref 0–0.2)
BASOPHILS NFR BLD AUTO: 1 %
BILIRUB SERPL-MCNC: 0.2 MG/DL (ref 0–1.2)
BUN SERPL-MCNC: 11 MG/DL (ref 6–20)
BUN/CREAT SERPL: 13 (ref 9–20)
CALCIUM SERPL-MCNC: 9.7 MG/DL (ref 8.7–10.2)
CHLORIDE SERPL-SCNC: 101 MMOL/L (ref 96–106)
CO2 SERPL-SCNC: 25 MMOL/L (ref 20–29)
CREAT SERPL-MCNC: 0.83 MG/DL (ref 0.76–1.27)
EOSINOPHIL # BLD AUTO: 0.2 X10E3/UL (ref 0–0.4)
EOSINOPHIL NFR BLD AUTO: 3 %
ERYTHROCYTE [DISTWIDTH] IN BLOOD BY AUTOMATED COUNT: 12.1 % (ref 12.3–15.4)
GLOBULIN SER CALC-MCNC: 2.6 G/DL (ref 1.5–4.5)
GLUCOSE SERPL-MCNC: 91 MG/DL (ref 65–99)
HCT VFR BLD AUTO: 46.4 % (ref 37.5–51)
HGB BLD-MCNC: 15.9 G/DL (ref 13–17.7)
IMM GRANULOCYTES # BLD AUTO: 0 X10E3/UL (ref 0–0.1)
IMM GRANULOCYTES NFR BLD AUTO: 0 %
LYMPHOCYTES # BLD AUTO: 3.3 X10E3/UL (ref 0.7–3.1)
LYMPHOCYTES NFR BLD AUTO: 40 %
MCH RBC QN AUTO: 31 PG (ref 26.6–33)
MCHC RBC AUTO-ENTMCNC: 34.3 G/DL (ref 31.5–35.7)
MCV RBC AUTO: 90 FL (ref 79–97)
MONOCYTES # BLD AUTO: 0.5 X10E3/UL (ref 0.1–0.9)
MONOCYTES NFR BLD AUTO: 7 %
NEUTROPHILS # BLD AUTO: 4 X10E3/UL (ref 1.4–7)
NEUTROPHILS NFR BLD AUTO: 49 %
PLATELET # BLD AUTO: 300 X10E3/UL (ref 150–450)
POTASSIUM SERPL-SCNC: 4.4 MMOL/L (ref 3.5–5.2)
PROT SERPL-MCNC: 7.2 G/DL (ref 6–8.5)
RBC # BLD AUTO: 5.13 X10E6/UL (ref 4.14–5.8)
SODIUM SERPL-SCNC: 141 MMOL/L (ref 134–144)
TSH SERPL DL<=0.005 MIU/L-ACNC: 1.5 UIU/ML (ref 0.45–4.5)
WBC # BLD AUTO: 8.1 X10E3/UL (ref 3.4–10.8)

## 2020-08-11 ENCOUNTER — OFFICE VISIT (OUTPATIENT)
Dept: INTERNAL MEDICINE CLINIC | Age: 35
End: 2020-08-11
Payer: COMMERCIAL

## 2020-08-11 VITALS
WEIGHT: 188 LBS | TEMPERATURE: 98.2 F | HEART RATE: 86 BPM | DIASTOLIC BLOOD PRESSURE: 75 MMHG | RESPIRATION RATE: 18 BRPM | HEIGHT: 68 IN | OXYGEN SATURATION: 99 % | SYSTOLIC BLOOD PRESSURE: 110 MMHG | BODY MASS INDEX: 28.49 KG/M2

## 2020-08-11 DIAGNOSIS — Z23 ENCOUNTER FOR IMMUNIZATION: ICD-10-CM

## 2020-08-11 DIAGNOSIS — F33.9 RECURRENT MAJOR DEPRESSIVE DISORDER, REMISSION STATUS UNSPECIFIED (HCC): Primary | ICD-10-CM

## 2020-08-11 PROCEDURE — 90471 IMMUNIZATION ADMIN: CPT | Performed by: INTERNAL MEDICINE

## 2020-08-11 PROCEDURE — 99214 OFFICE O/P EST MOD 30 MIN: CPT | Performed by: INTERNAL MEDICINE

## 2020-08-11 PROCEDURE — 90715 TDAP VACCINE 7 YRS/> IM: CPT

## 2020-08-11 RX ORDER — PAROXETINE HYDROCHLORIDE 20 MG/1
20 TABLET, FILM COATED ORAL 2 TIMES DAILY
Qty: 180 TAB | Refills: 0 | Status: SHIPPED | OUTPATIENT
Start: 2020-08-11 | End: 2020-11-11 | Stop reason: SDUPTHER

## 2020-08-11 RX ORDER — BUPROPION HYDROCHLORIDE 150 MG/1
150 TABLET ORAL
Qty: 90 TAB | Refills: 0 | Status: SHIPPED | OUTPATIENT
Start: 2020-08-11 | End: 2020-11-11 | Stop reason: SDUPTHER

## 2020-08-11 NOTE — PROGRESS NOTES
Evaluation. Chani Holilday is a 27 y.o. male  who present for routine immunizations. he  denies any symptoms , reactions or allergies that would exclude them from being immunized today. Risks and adverse reactions were discussed and the VIS was given to them. All questions were addressed. he was observed for 10 min post injection. There were no reactions observed.     Blima Gilford, RN

## 2020-08-11 NOTE — PATIENT INSTRUCTIONS
Tdap (Tetanus, Diphtheria, Pertussis) Vaccine: What You Need to Know Why get vaccinated? Tdap vaccine can prevent tetanus, diphtheria, and pertussis. Diphtheria and pertussis spread from person to person. Tetanus enters the body through cuts or wounds. · TETANUS (T) causes painful stiffening of the muscles. Tetanus can lead to serious health problems, including being unable to open the mouth, having trouble swallowing and breathing, or death. · DIPHTHERIA (D) can lead to difficulty breathing, heart failure, paralysis, or death. · PERTUSSIS (aP), also known as \"whooping cough,\" can cause uncontrollable, violent coughing which makes it hard to breathe, eat, or drink. Pertussis can be extremely serious in babies and young children, causing pneumonia, convulsions, brain damage, or death. In teens and adults, it can cause weight loss, loss of bladder control, passing out, and rib fractures from severe coughing. Tdap vaccine Tdap is only for children 7 years and older, adolescents, and adults. Adolescents should receive a single dose of Tdap, preferably at age 6 or 15 years. Pregnant women should get a dose of Tdap during every pregnancy, to protect the  from pertussis. Infants are most at risk for severe, life threatening complications from pertussis. Adults who have never received Tdap should get a dose of Tdap. Also, adults should receive a booster dose every 10 years, or earlier in the case of a severe and dirty wound or burn. Booster doses can be either Tdap or Td (a different vaccine that protects against tetanus and diphtheria but not pertussis). Tdap may be given at the same time as other vaccines. Talk with your health care provider Tell your vaccine provider if the person getting the vaccine: 
· Has had an allergic reaction after a previous dose of any vaccine that protects against tetanus, diphtheria, or pertussis, or has any severe, life threatening allergies. · Has had a coma, decreased level of consciousness, or prolonged seizures within 7 days after a previous dose of any pertussis vaccine (DTP, DTaP, or Tdap). · Has seizures or another nervous system problem. · Has ever had Guillain-Barré Syndrome (also called GBS). · Has had severe pain or swelling after a previous dose of any vaccine that protects against tetanus or diphtheria. In some cases, your health care provider may decide to postpone Tdap vaccination to a future visit. People with minor illnesses, such as a cold, may be vaccinated. People who are moderately or severely ill should usually wait until they recover before getting Tdap vaccine. Your health care provider can give you more information. Risks of a vaccine reaction · Pain, redness, or swelling where the shot was given, mild fever, headache, feeling tired, and nausea, vomiting, diarrhea, or stomachache sometimes happen after Tdap vaccine. People sometimes faint after medical procedures, including vaccination. Tell your provider if you feel dizzy or have vision changes or ringing in the ears. As with any medicine, there is a very remote chance of a vaccine causing a severe allergic reaction, other serious injury, or death. What if there is a serious problem? An allergic reaction could occur after the vaccinated person leaves the clinic. If you see signs of a severe allergic reaction (hives, swelling of the face and throat, difficulty breathing, a fast heartbeat, dizziness, or weakness), call 9-1-1 and get the person to the nearest hospital. 
For other signs that concern you, call your health care provider. Adverse reactions should be reported to the Vaccine Adverse Event Reporting System (VAERS). Your health care provider will usually file this report, or you can do it yourself. Visit the VAERS website at www.vaers. hhs.gov or call 0-866.685.7487. VAERS is only for reporting reactions, and VAERS staff do not give medical advice. The National Vaccine Injury Compensation Program 
The National Vaccine Injury Compensation Program (VICP) is a federal program that was created to compensate people who may have been injured by certain vaccines. Visit the VICP website at www.hrsa.gov/vaccinecompensation or call 7-276.214.5320 to learn about the program and about filing a claim. There is a time limit to file a claim for compensation. How can I learn more? · Ask your health care provider. · Call your local or state health department. · Contact the Centers for Disease Control and Prevention (CDC): 
? Call 6-136.551.9055 (1-800-CDC-INFO) or 
? Visit CDC's website at www.cdc.gov/vaccines Vaccine Information Statement (Interim) Tdap (Tetanus, Diphtheria, Pertussis) Vaccine 04/01/2020 
[id number] [facility] Centers for Disease Control and Prevention Many Vaccine Information Statements are available in Malagasy and other languages. See www.immunize.org/vis. Muchas hojas de información sobre vacunas están disponibles en español y en otros idiomas. Visite www.immunize.org/vis. Care instructions adapted under license by [employer] (which disclaims liability or warranty for this information). If you have questions about a medical condition or this instruction, always ask your healthcare professional. [address] any warranty or liability for your use of this information. Learning About Mindfulness for Stress What are mindfulness and stress? Stress is what you feel when you have to handle more than you are used to. A lot of things can cause stress. You may feel stress when you go on a job interview, take a test, or run a race. This kind of short-term stress is normal and even useful. It can help you if you need to work hard or react quickly. Stress also can last a long time.  Long-term stress is caused by stressful situations or events. Examples of long-term stress include long-term health problems, ongoing problems at work, and conflicts in your family. Long-term stress can harm your health. Mindfulness is a focus only on things happening in the present moment. It's a process of purposefully paying attention to and being aware of your surroundings, your emotions, your thoughts, and how your body feels. You are aware of these things, but you aren't judging these experiences as \"good\" or \"bad. \" Mindfulness can help you learn to calm your mind and body to help you cope with illness, pain, and stress. How does mindfulness help to relieve stress? Mindfulness can help quiet your mind and relax your body. Studies show that it can help some people sleep better, feel less anxious, and bring their blood pressure down. And it's been shown to help some people live and cope better with certain health problems like heart disease, depression, chronic pain, and cancer. How do you practice mindfulness? To be mindful is to pay attention, to be present, and to be accepting. · When you're mindful, you do just one thing and you pay close attention to that one thing. For example, you may sit quietly and notice your emotions or how your food tastes and smells. · When you're present, you focus on the things that are happening right now. You let go of your thoughts about the past and the future. When you dwell on the past or the future, you miss moments that can heal and strengthen you. You may miss moments like hearing a child laugh or seeing a friendly face when you think you're all alone. · When you're accepting, you don't  the present moment. Instead you accept your thoughts and feelings as they come. You can practice anytime, anywhere, and in any way you choose. You can practice in many ways. Here are a few ideas: · While doing your chores, like washing the dishes, let your mind focus on what's in your hand. What does the dish feel like? Is the water warm or cold? · Go outside and take a few deep breaths. What is the air like? Is it warm or cold? · When you can, take some time at the start of your day to sit alone and think. · Take a slow walk by yourself. Count your steps while you breathe in and out. · Try yoga breathing exercises, stretches, and poses to strengthen and relax your muscles. · At work, if you can, try to stop for a few moments each hour. Note how your body feels. Let yourself regroup and let your mind settle before you return to what you were doing. · If you struggle with anxiety or \"worry thoughts,\" imagine your mind as a blue pallavi and your worry thoughts as clouds. Now imagine those worry thoughts floating across your mind's pallavi. Just let them pass by as you watch. Follow-up care is a key part of your treatment and safety. Be sure to make and go to all appointments, and call your doctor if you are having problems. It's also a good idea to know your test results and keep a list of the medicines you take. Where can you learn more? Go to http://chris-isabel.info/ Enter U062 in the search box to learn more about \"Learning About Mindfulness for Stress. \" Current as of: December 16, 2019               Content Version: 12.5 © 8615-1109 [employer]. Care instructions adapted under license by [employer] (which disclaims liability or warranty for this information). If you have questions about a medical condition or this instruction, always ask your healthcare professional. [address] any warranty or liability for your use of this information.

## 2020-08-11 NOTE — PROGRESS NOTES
Blake Wilson is a 27 y.o. male who was seen in clinic today (8/11/2020). Assessment & Plan:     Diagnoses and all orders for this visit:    1. Recurrent major depressive disorder, remission status unspecified ([facility]- this is a chronic problem, it is poorly controlled and worsening, it sound situational, reviewed life style changes, will restart Paxil. Reviewed it has been working for him for the last 5 yrs and now is not the time to give up on it. Has also been on multiple SSRI and SNRI in the past.  Will add in Wellbutrin. Red flags and expectations were reviewed & discussed with the him. He verbalized understanding.    -     PARoxetine (PaxiL) 20 mg tablet; Take 1 Tab by mouth two (2) times a day. -     buPROPion XL (WELLBUTRIN XL) 150 mg tablet; Take 1 Tab by mouth every morning. 2. Encounter for immunization  -     TETANUS, DIPHTHERIA TOXOIDS AND ACELLULAR PERTUSSIS VACCINE (TDAP), IN INDIVIDS. >=7, IM  -     [place] IMMUNIZ ADMIN,1 SINGLE/COMB VAC/TOXOID      Follow-up and Dispositions    · Return in about 6 weeks (around 9/22/2020) for Regular follow up. Subjective:   Zane Jensen was seen today for Depression        Mental Health Review  Patient is seen for depression. PHQ9 reviewed. He reports little things are starting to bother him more then they should (at home and at work). He will start to focus on it, get angry, and then that drives him mood down. Reports experiences the following side effects from the treatment: none. He thinks medication was not helping. He stopped about 2-3 months ago. He had been getting from [place] ([facility] He thinks he has been on the medication for ~3 yrs. He is not sure of the exact dose.       3 most recent PHQ Screens 8/11/2020   Little interest or pleasure in doing things Several days   Feeling down, depressed, irritable, or hopeless Several days   Total Score PHQ 2 2   Trouble falling or staying asleep, or sleeping too much Not at all   Feeling tired or having little energy Nearly every day   Poor appetite, weight loss, or overeating Not at all   Feeling bad about yourself - or that you are a failure or have let yourself or your family down Not at all   Trouble concentrating on things such as school, work, reading, or watching TV Not at all   Moving or speaking so slowly that other people could have noticed; or the opposite being so fidgety that others notice Not at all   Thoughts of being better off dead, or hurting yourself in some way Not at all   PHQ 9 Score 5   How difficult have these problems made it for you to do your work, take care of your home and get along with others Not difficult at all           Prior to Admission medications    Medication Sig Start Date End Date Taking? Authorizing Provider   albuterol sulfate (PROAIR RESPICLICK) 90 mcg/actuation aepb Take 1-2 Puffs by inhalation every four (4) hours as needed for Cough (or SOB). 9/30/19 8/11/20  Loni Boucher MD   PARoxetine (PAXIL) 20 mg tablet Take 20 mg by mouth two (2) times a day. 8/11/20  Provider, Historical   LORazepam (ATIVAN) 1 mg tablet TAKE ONE TABLET BY MOUTH ONCE DAILY AS NEEDED FOR ANXIETY 2/6/17 8/11/20  Loni Boucher MD          No Known Allergies        Review of Systems   Respiratory: Negative for cough and shortness of breath. Cardiovascular: Negative for chest pain and palpitations. Gastrointestinal: Negative for abdominal pain, constipation, diarrhea, nausea and vomiting. Objective:   Physical Exam  Cardiovascular:      Rate and Rhythm: Regular rhythm. Heart sounds: Normal heart sounds. No murmur. Pulmonary:      Effort: Pulmonary effort is normal.      Breath sounds: Normal breath sounds. No wheezing or rales. Abdominal:      General: Bowel sounds are normal.      Palpations: Abdomen is soft. There is no mass. Tenderness: There is no abdominal tenderness.    Psychiatric:         Mood and Affect: Mood normal.         Behavior: Behavior normal. Visit Vitals  /75   Pulse 86   Temp 98.2 °F (36.8 °C) (Temporal)   Resp 18   Ht 5' 8\" (1.727 m)   Wt 188 lb (85.3 kg)   SpO2 99%   BMI 28.59 kg/m²         Disclaimer:  Advised him to call back or return to office if symptoms worsen/change/persist.  Discussed expected course/resolution/complications of diagnosis in detail with patient. Medication risks/benefits/costs/interactions/alternatives discussed with patient. He was given an after visit summary which includes diagnoses, current medications, & vitals. He expressed understanding with the diagnosis and plan. Aspects of this note may have been generated using voice recognition software. Despite editing, there may be some syntax errors.        Flora Alves MD

## 2020-09-29 ENCOUNTER — OFFICE VISIT (OUTPATIENT)
Dept: INTERNAL MEDICINE CLINIC | Age: 35
End: 2020-09-29
Payer: COMMERCIAL

## 2020-09-29 VITALS
WEIGHT: 184.8 LBS | OXYGEN SATURATION: 98 % | HEART RATE: 85 BPM | HEIGHT: 68 IN | RESPIRATION RATE: 16 BRPM | TEMPERATURE: 97.8 F | SYSTOLIC BLOOD PRESSURE: 112 MMHG | BODY MASS INDEX: 28.01 KG/M2 | DIASTOLIC BLOOD PRESSURE: 78 MMHG

## 2020-09-29 DIAGNOSIS — F33.42 RECURRENT MAJOR DEPRESSIVE DISORDER, IN FULL REMISSION (HCC): Primary | ICD-10-CM

## 2020-09-29 PROCEDURE — 99213 OFFICE O/P EST LOW 20 MIN: CPT | Performed by: INTERNAL MEDICINE

## 2020-09-29 NOTE — PROGRESS NOTES
Juan Greenberg is a 27 y.o. male who was seen in clinic today (9/29/2020). Assessment & Plan:   Diagnoses and all orders for this visit:    1. Recurrent major depressive disorder, in full remission ([facility]- improved, well controlled. I spent 15 minutes with the patient and >50% of the time was spent counseling him on treatment options, I reviewed life style changes to help improve mood, continue current treatment. Reviewed at f/u can reassess need for both medications and maybe able to simplify to one med. At this time will not make any changes as he is doing well. Follow-up and Dispositions    · Return in about 6 months (around 3/29/2021) for Regular follow up. Subjective:   Joshua Poewll was seen today for Depression      Since last visit: restarted Paxil and added in Wellbutrin. PHQ9 reviewed. He reports feeling perfect. It took a week for the medication to get into his system. He reports he is not as frustrated as he used to be. Reports experiences the following side effects from the treatment: none.       3 most recent PHQ Screens 9/29/2020   Little interest or pleasure in doing things Not at all   Feeling down, depressed, irritable, or hopeless Not at all   Total Score PHQ 2 0   Trouble falling or staying asleep, or sleeping too much Not at all   Feeling tired or having little energy Not at all   Poor appetite, weight loss, or overeating Not at all   Feeling bad about yourself - or that you are a failure or have let yourself or your family down Not at all   Trouble concentrating on things such as school, work, reading, or watching TV Not at all   Moving or speaking so slowly that other people could have noticed; or the opposite being so fidgety that others notice Not at all   Thoughts of being better off dead, or hurting yourself in some way Not at all   PHQ 9 Score 0   How difficult have these problems made it for you to do your work, take care of your home and get along with others Not difficult at all              Prior to Admission medications    Medication Sig Start Date End Date Taking? Authorizing Provider   PARoxetine (PaxiL) 20 mg tablet Take 1 Tab by mouth two (2) times a day. 8/11/20  Yes Stacy Scott MD   buPROPion XL (WELLBUTRIN XL) 150 mg tablet Take 1 Tab by mouth every morning. 8/11/20  Yes Stacy Scott MD          No Known Allergies        ROS - per HPI    Objective:   Physical Exam - well dressed, well groomed, good eye contact      Visit Vitals  /78   Pulse 85   Temp 97.8 °F (36.6 °C) (Temporal)   Resp 16   Ht 5' 8\" (1.727 m)   Wt 184 lb 12.8 oz (83.8 kg)   SpO2 98%   BMI 28.10 kg/m²         Disclaimer:  Advised him to call back or return to office if symptoms worsen/change/persist.  Discussed expected course/resolution/complications of diagnosis in detail with patient. Medication risks/benefits/costs/interactions/alternatives discussed with patient. He was given an after visit summary which includes diagnoses, current medications, & vitals. He expressed understanding with the diagnosis and plan. Aspects of this note may have been generated using voice recognition software. Despite editing, there may be some syntax errors.        Lucy Lee MD

## 2020-11-11 DIAGNOSIS — F33.9 RECURRENT MAJOR DEPRESSIVE DISORDER, REMISSION STATUS UNSPECIFIED (HCC): ICD-10-CM

## 2020-11-11 RX ORDER — BUPROPION HYDROCHLORIDE 150 MG/1
150 TABLET ORAL
Qty: 90 TAB | Refills: 0 | Status: SHIPPED | OUTPATIENT
Start: 2020-11-11 | End: 2021-02-23

## 2020-11-11 RX ORDER — PAROXETINE HYDROCHLORIDE 20 MG/1
20 TABLET, FILM COATED ORAL 2 TIMES DAILY
Qty: 180 TAB | Refills: 0 | Status: SHIPPED | OUTPATIENT
Start: 2020-11-11 | End: 2021-02-23

## 2020-11-11 NOTE — TELEPHONE ENCOUNTER
Requested Prescriptions     Pending Prescriptions Disp Refills    PARoxetine (PaxiL) 20 mg tablet 180 Tab 0     Sig: Take 1 Tab by mouth two (2) times a day.  buPROPion XL (WELLBUTRIN XL) 150 mg tablet 90 Tab 0     Sig: Take 1 Tab by mouth every morning.

## 2021-02-22 DIAGNOSIS — F33.9 RECURRENT MAJOR DEPRESSIVE DISORDER, REMISSION STATUS UNSPECIFIED (HCC): ICD-10-CM

## 2021-02-23 RX ORDER — PAROXETINE HYDROCHLORIDE 20 MG/1
TABLET, FILM COATED ORAL
Qty: 120 TAB | Refills: 0 | Status: SHIPPED | OUTPATIENT
Start: 2021-02-23 | End: 2021-04-30 | Stop reason: SDUPTHER

## 2021-02-23 RX ORDER — BUPROPION HYDROCHLORIDE 150 MG/1
TABLET ORAL
Qty: 30 TAB | Refills: 0 | Status: SHIPPED | OUTPATIENT
Start: 2021-02-23 | End: 2021-03-28

## 2021-03-28 DIAGNOSIS — F33.9 RECURRENT MAJOR DEPRESSIVE DISORDER, REMISSION STATUS UNSPECIFIED (HCC): ICD-10-CM

## 2021-03-28 RX ORDER — BUPROPION HYDROCHLORIDE 150 MG/1
TABLET ORAL
Qty: 30 TAB | Refills: 0 | Status: SHIPPED | OUTPATIENT
Start: 2021-03-28 | End: 2021-04-21

## 2021-04-19 DIAGNOSIS — F33.9 RECURRENT MAJOR DEPRESSIVE DISORDER, REMISSION STATUS UNSPECIFIED (HCC): ICD-10-CM

## 2021-04-21 RX ORDER — BUPROPION HYDROCHLORIDE 150 MG/1
TABLET ORAL
Qty: 15 TAB | Refills: 0 | Status: SHIPPED | OUTPATIENT
Start: 2021-04-21 | End: 2021-04-30 | Stop reason: SDUPTHER

## 2021-04-30 ENCOUNTER — OFFICE VISIT (OUTPATIENT)
Dept: INTERNAL MEDICINE CLINIC | Age: 36
End: 2021-04-30
Payer: COMMERCIAL

## 2021-04-30 VITALS
WEIGHT: 192 LBS | DIASTOLIC BLOOD PRESSURE: 74 MMHG | HEIGHT: 70 IN | SYSTOLIC BLOOD PRESSURE: 108 MMHG | OXYGEN SATURATION: 100 % | HEART RATE: 80 BPM | RESPIRATION RATE: 12 BRPM | BODY MASS INDEX: 27.49 KG/M2 | TEMPERATURE: 97.6 F

## 2021-04-30 DIAGNOSIS — Z72.0 TOBACCO USE: ICD-10-CM

## 2021-04-30 DIAGNOSIS — F33.42 RECURRENT MAJOR DEPRESSIVE DISORDER, IN FULL REMISSION (HCC): ICD-10-CM

## 2021-04-30 DIAGNOSIS — Z00.00 ROUTINE PHYSICAL EXAMINATION: Primary | ICD-10-CM

## 2021-04-30 DIAGNOSIS — F41.9 ANXIETY: ICD-10-CM

## 2021-04-30 LAB
ALBUMIN SERPL-MCNC: 3.9 G/DL (ref 3.5–5)
ALBUMIN/GLOB SERPL: 1.1 {RATIO} (ref 1.1–2.2)
ALP SERPL-CCNC: 137 U/L (ref 45–117)
ALT SERPL-CCNC: 36 U/L (ref 12–78)
ANION GAP SERPL CALC-SCNC: 4 MMOL/L (ref 5–15)
AST SERPL-CCNC: 18 U/L (ref 15–37)
BILIRUB SERPL-MCNC: 0.3 MG/DL (ref 0.2–1)
BUN SERPL-MCNC: 16 MG/DL (ref 6–20)
BUN/CREAT SERPL: 21 (ref 12–20)
CALCIUM SERPL-MCNC: 9 MG/DL (ref 8.5–10.1)
CHLORIDE SERPL-SCNC: 108 MMOL/L (ref 97–108)
CHOLEST SERPL-MCNC: 262 MG/DL
CO2 SERPL-SCNC: 28 MMOL/L (ref 21–32)
CREAT SERPL-MCNC: 0.77 MG/DL (ref 0.7–1.3)
ERYTHROCYTE [DISTWIDTH] IN BLOOD BY AUTOMATED COUNT: 12.6 % (ref 11.5–14.5)
GLOBULIN SER CALC-MCNC: 3.5 G/DL (ref 2–4)
GLUCOSE SERPL-MCNC: 110 MG/DL (ref 65–100)
HCT VFR BLD AUTO: 46.4 % (ref 36.6–50.3)
HDLC SERPL-MCNC: 33 MG/DL
HDLC SERPL: 7.9 {RATIO} (ref 0–5)
HGB BLD-MCNC: 15 G/DL (ref 12.1–17)
LDLC SERPL CALC-MCNC: 182.8 MG/DL (ref 0–100)
LIPID PROFILE,FLP: ABNORMAL
MCH RBC QN AUTO: 30.3 PG (ref 26–34)
MCHC RBC AUTO-ENTMCNC: 32.3 G/DL (ref 30–36.5)
MCV RBC AUTO: 93.7 FL (ref 80–99)
NRBC # BLD: 0 K/UL (ref 0–0.01)
NRBC BLD-RTO: 0 PER 100 WBC
PLATELET # BLD AUTO: 338 K/UL (ref 150–400)
PMV BLD AUTO: 9.7 FL (ref 8.9–12.9)
POTASSIUM SERPL-SCNC: 4.6 MMOL/L (ref 3.5–5.1)
PROT SERPL-MCNC: 7.4 G/DL (ref 6.4–8.2)
RBC # BLD AUTO: 4.95 M/UL (ref 4.1–5.7)
SODIUM SERPL-SCNC: 140 MMOL/L (ref 136–145)
TRIGL SERPL-MCNC: 231 MG/DL (ref ?–150)
VLDLC SERPL CALC-MCNC: 46.2 MG/DL
WBC # BLD AUTO: 7.6 K/UL (ref 4.1–11.1)

## 2021-04-30 PROCEDURE — 99395 PREV VISIT EST AGE 18-39: CPT | Performed by: INTERNAL MEDICINE

## 2021-04-30 RX ORDER — BUPROPION HYDROCHLORIDE 150 MG/1
TABLET ORAL
Qty: 90 TAB | Refills: 1 | Status: SHIPPED | OUTPATIENT
Start: 2021-04-30 | End: 2021-11-01 | Stop reason: SDUPTHER

## 2021-04-30 RX ORDER — PAROXETINE HYDROCHLORIDE 20 MG/1
TABLET, FILM COATED ORAL
Qty: 180 TAB | Refills: 1 | Status: SHIPPED | OUTPATIENT
Start: 2021-04-30 | End: 2021-11-01 | Stop reason: SDUPTHER

## 2021-04-30 NOTE — ASSESSMENT & PLAN NOTE
stable and well controlled, I reviewed treatment options with him, I reviewed life style changes to help improve mood, continue current treatment. He was asking about increasing Wellbutrin. We talked about why and expectations.   We decided not to make any changes today

## 2021-04-30 NOTE — ASSESSMENT & PLAN NOTE
he was counseled on the dangers of tobacco use. He was advised to quit and reluctant to quit. Reviewed strategies to maximize success, including stress management and substitution of other forms of reinforcement.   We talked about the long term effects of smoking on his health

## 2021-04-30 NOTE — PROGRESS NOTES
Yao Spain is a 38 y.o. male who was seen in clinic today (4/30/2021) for a physical.            Assessment & Plan:     1. Routine physical examination  -     METABOLIC PANEL, COMPREHENSIVE; Future  -     CBC W/O DIFF; Future  -     LIPID PANEL; Future  2. Recurrent major depressive disorder, in full remission [facility])  Assessment & Plan:  stable and well controlled, I reviewed treatment options with him, I reviewed life style changes to help improve mood, continue current treatment. He was asking about increasing Wellbutrin. We talked about why and expectations. We decided not to make any changes today  Orders:  -     buPROPion XL (WELLBUTRIN XL) 150 mg tablet; 1 tab PO daily. , Normal, Disp-90 Tab, R-1  -     PARoxetine (PAXIL) 20 mg tablet; TAKE ONE TABLET BY MOUTH TWICE A DAY, Normal, Disp-180 Tab, R-1  3. Anxiety  Assessment & Plan:  stable and well controlled, I reviewed treatment options with him, I reviewed life style changes to help improve mood, continue current treatment. Orders:  -     buPROPion XL (WELLBUTRIN XL) 150 mg tablet; 1 tab PO daily. , Normal, Disp-90 Tab, R-1  -     PARoxetine (PAXIL) 20 mg tablet; TAKE ONE TABLET BY MOUTH TWICE A DAY, Normal, Disp-180 Tab, R-1  4. Tobacco use  Assessment & Plan:  he was counseled on the dangers of tobacco use. He was advised to quit and reluctant to quit. Reviewed strategies to maximize success, including stress management and substitution of other forms of reinforcement. We talked about the long term effects of smoking on his health    Follow-up and Dispositions    · Return in about 6 months (around 10/30/2021) for Regular follow up. Subjective:   Suresh Massey is here today for a full physical.      Since last visit: no changes      Health Maintenance  Immunizations:   Influenza: declined  Tetanus: up to date     Cancer screening:    Reviewed testicular, prostate, and colon cancer screening guidelines.       The following acute and/or chronic problems were addressed today:    Mental Health Review  Patient is seen for anxiety & depression. GAD7 and PHQ9 reviewed. Reports experiences the following side effects from the treatment: none. Pulmonary Review  The patient is being seen for smoking (tobacco use). He admits to smoking 10 cigarettes today. He does not desire to quit. Current limitations in activity: none. Coughing when present is described as none. The following sections were reviewed & updated as appropriate: Problem List, Allergies, PMH, PSH, FH, and SH. Prior to Admission medications    Medication Sig Start Date End Date Taking? Authorizing Provider   buPROPion XL (WELLBUTRIN XL) 150 mg tablet 1 tab PO daily. Appointment required prior to any further refills 4/21/21  Yes Jarett Das MD   PARoxetine (PAXIL) 20 mg tablet TAKE ONE TABLET BY MOUTH TWICE A DAY 2/23/21  Yes Jarett Das MD          Review of Systems   Constitutional: Negative for chills and fever. Respiratory: Negative for cough and shortness of breath. Cardiovascular: Negative for chest pain and palpitations. Gastrointestinal: Negative for abdominal pain, blood in stool, constipation, diarrhea, heartburn, nausea and vomiting. Musculoskeletal: Negative for joint pain and myalgias. Neurological: Negative for tingling, focal weakness and headaches. Endo/Heme/Allergies: Does not bruise/bleed easily. Psychiatric/Behavioral: Negative for depression. The patient is not nervous/anxious and does not have insomnia. Subjective:   Physical Exam  Constitutional:       General: He is not in acute distress. Appearance: He is well-developed. HENT:      Right Ear: Tympanic membrane and ear canal normal.      Left Ear: Tympanic membrane and ear canal normal.   Eyes:      Conjunctiva/sclera: Conjunctivae normal.   Neck:      Thyroid: No thyromegaly. Cardiovascular:      Rate and Rhythm: Regular rhythm. Heart sounds: Normal heart sounds. No murmur. Pulmonary:      Effort: Pulmonary effort is normal.      Breath sounds: Normal breath sounds. Abdominal:      General: Bowel sounds are normal.      Palpations: Abdomen is soft. Tenderness: There is no abdominal tenderness. Musculoskeletal:      Right lower leg: No edema. Left lower leg: No edema. Lymphadenopathy:      Cervical: No cervical adenopathy.    Psychiatric:         Mood and Affect: Mood and affect normal.            Visit Vitals  /74   Pulse 80   Temp 97.6 °F (36.4 °C) (Temporal)   Resp 12   Ht 5' 9.5\" (1.765 m)   Wt 192 lb (87.1 kg)   SpO2 100%   BMI 27.95 kg/m²          Bisi Mcwilliams MD

## 2021-04-30 NOTE — PATIENT INSTRUCTIONS
Well Visit, Ages 25 to 48: Care Instructions Overview Well visits can help you stay healthy. Your doctor has checked your overall health and may have suggested ways to take good care of yourself. Your doctor also may have recommended tests. At home, you can help prevent illness with healthy eating, regular exercise, and other steps. Follow-up care is a key part of your treatment and safety. Be sure to make and go to all appointments, and call your doctor if you are having problems. It's also a good idea to know your test results and keep a list of the medicines you take. How can you care for yourself at home? · Get screening tests that you and your doctor decide on. Screening helps find diseases before any symptoms appear. · Eat healthy foods. Choose fruits, vegetables, whole grains, protein, and low-fat dairy foods. Limit fat, especially saturated fat. Reduce salt in your diet. · Limit alcohol. If you are a man, have no more than 2 drinks a day or 14 drinks a week. If you are a woman, have no more than 1 drink a day or 7 drinks a week. · Get at least 30 minutes of physical activity on most days of the week. Walking is a good choice. You also may want to do other activities, such as running, swimming, cycling, or playing tennis or team sports. Discuss any changes in your exercise program with your doctor. · Reach and stay at a healthy weight. This will lower your risk for many problems, such as obesity, diabetes, heart disease, and high blood pressure. · Do not smoke or allow others to smoke around you. If you need help quitting, talk to your doctor about stop-smoking programs and medicines. These can increase your chances of quitting for good. · Care for your mental health. It is easy to get weighed down by worry and stress. Learn strategies to manage stress, like deep breathing and mindfulness, and stay connected with your family and community.  If you find you often feel sad or hopeless, talk with your doctor. Treatment can help. · Talk to your doctor about whether you have any risk factors for sexually transmitted infections (STIs). You can help prevent STIs if you wait to have sex with a new partner (or partners) until you've each been tested for STIs. It also helps if you use condoms (male or female condoms) and if you limit your sex partners to one person who only has sex with you. Vaccines are available for some STIs, such as HPV. · Use birth control if it's important to you to prevent pregnancy. Talk with your doctor about the choices available and what might be best for you. · If you think you may have a problem with alcohol or drug use, talk to your doctor. This includes prescription medicines (such as amphetamines and opioids) and illegal drugs (such as cocaine and methamphetamine). Your doctor can help you figure out what type of treatment is best for you. · Protect your skin from too much sun. When you're outdoors from 10 a.m. to 4 p.m., stay in the shade or cover up with clothing and a hat with a wide brim. Wear sunglasses that block UV rays. Even when it's cloudy, put broad-spectrum sunscreen (SPF 30 or higher) on any exposed skin. · See a dentist one or two times a year for checkups and to have your teeth cleaned. · Wear a seat belt in the car. When should you call for help? Watch closely for changes in your health, and be sure to contact your doctor if you have any problems or symptoms that concern you. Where can you learn more? Go to http://www.[employer].com/ Enter P072 in the search box to learn more about \"Well Visit, Ages 25 to 48: Care Instructions. \" Current as of: May 27, 2020               Content Version: 12.8 © 7816-8549 [employer]. Care instructions adapted under license by [employer] (which disclaims liability or warranty for this information).  If you have questions about a medical condition or this instruction, always ask your healthcare professional. [place] [address] any warranty or liability for your use of this information.

## 2021-05-03 NOTE — PROGRESS NOTES
Results released to patient via [facility]. All labs are stable or at goal for him, except for elevated FBS and elevated LDL. To young to calculate 10 yr CVD risk. Life style changes. Will repeat next year.

## 2021-05-14 NOTE — PROGRESS NOTES
Chief Complaint   Patient presents with    Nasal Pain     pt has an area inside left nostril that bleeds    Mouth Pain     Nasal passage feels blocked on the left side with drainage and dryness Please sign pre op med order

## 2021-11-01 ENCOUNTER — OFFICE VISIT (OUTPATIENT)
Dept: INTERNAL MEDICINE CLINIC | Age: 36
End: 2021-11-01
Payer: COMMERCIAL

## 2021-11-01 VITALS
WEIGHT: 197 LBS | RESPIRATION RATE: 12 BRPM | TEMPERATURE: 98.4 F | HEIGHT: 70 IN | OXYGEN SATURATION: 98 % | HEART RATE: 87 BPM | SYSTOLIC BLOOD PRESSURE: 115 MMHG | DIASTOLIC BLOOD PRESSURE: 70 MMHG | BODY MASS INDEX: 28.2 KG/M2

## 2021-11-01 DIAGNOSIS — F33.42 RECURRENT MAJOR DEPRESSIVE DISORDER, IN FULL REMISSION (HCC): Primary | ICD-10-CM

## 2021-11-01 DIAGNOSIS — F41.9 ANXIETY: ICD-10-CM

## 2021-11-01 DIAGNOSIS — Z72.0 TOBACCO USE: ICD-10-CM

## 2021-11-01 PROCEDURE — 99214 OFFICE O/P EST MOD 30 MIN: CPT | Performed by: INTERNAL MEDICINE

## 2021-11-01 RX ORDER — BUPROPION HYDROCHLORIDE 150 MG/1
TABLET ORAL
Qty: 90 TABLET | Refills: 1 | Status: SHIPPED | OUTPATIENT
Start: 2021-11-01 | End: 2022-05-03 | Stop reason: SDUPTHER

## 2021-11-01 RX ORDER — PAROXETINE HYDROCHLORIDE 20 MG/1
TABLET, FILM COATED ORAL
Qty: 180 TABLET | Refills: 1 | Status: SHIPPED | OUTPATIENT
Start: 2021-11-01 | End: 2022-05-03 | Stop reason: SDUPTHER

## 2021-11-01 NOTE — PROGRESS NOTES
Fer Huerta is a 38 y.o. male who was seen in clinic today (11/1/2021). Assessment & Plan:   Below is the assessment and plan developed based on review of pertinent history, physical exam, labs, studies, and medications. 1. Recurrent major depressive disorder, in full remission ([facility]  Assessment & Plan:  well controlled, continue current treatment    Orders:  -     buPROPion XL (WELLBUTRIN XL) 150 mg tablet; 1 tab PO daily. , Normal, Disp-90 Tablet, R-1  -     PARoxetine (PAXIL) 20 mg tablet; TAKE ONE TABLET BY MOUTH TWICE A DAY, Normal, Disp-180 Tablet, R-1  2. Anxiety  Assessment & Plan:  at goal, continue current treatment    Orders:  -     buPROPion XL (WELLBUTRIN XL) 150 mg tablet; 1 tab PO daily. , Normal, Disp-90 Tablet, R-1  -     PARoxetine (PAXIL) 20 mg tablet; TAKE ONE TABLET BY MOUTH TWICE A DAY, Normal, Disp-180 Tablet, R-1  3. Tobacco use  Assessment & Plan:  Stable, no desire to quit, reviewed trying to cut down from 10/day to 5/day     Will f/u in 6 months and if continuing to do good can space out to 1 year. Follow-up and Dispositions    · Return in about 6 months (around 5/1/2022) for Regular follow up. Subjective/Objective:   Turner Ruth was seen today for Anxiety and Depression      Since last visit: no changes, he will be traveling to [place] ([facility]) to see family next week    [address] Review  Patient is seen for anxiety & depression. He reports he is doing great. He will still get overwhelmed rarely, able to decompression by sitting on the porch and drinking tea. Any available GAD7 and PHQ9 reviewed. Reports experiences the following side effects from the treatment: none. Pulmonary Review  The patient is being seen for tobacco abuse. He admits to smoking 10 cigarettes today. He does desire to quit but he does not want set a quit date. He is trying to delay smoking but staying at work. Current limitations in activity: none. Coughing when present is described as none. Prior to Admission medications    Medication Sig Start Date End Date Taking? Authorizing Provider   buPROPion XL (WELLBUTRIN XL) 150 mg tablet 1 tab PO daily. 4/30/21  Yes Rodríguez Pérez MD   PARoxetine (PAXIL) 20 mg tablet TAKE ONE TABLET BY MOUTH TWICE A DAY 4/30/21  Yes Rodríguez Pérez MD        Review of Systems   Respiratory: Negative for cough and shortness of breath. Cardiovascular: Negative for chest pain and palpitations. Gastrointestinal: Negative for abdominal pain, constipation, diarrhea, nausea and vomiting. Physical Exam  Cardiovascular:      Rate and Rhythm: Regular rhythm. Heart sounds: Normal heart sounds. No murmur heard. Pulmonary:      Effort: Pulmonary effort is normal.      Breath sounds: Normal breath sounds. No wheezing or rales. Abdominal:      General: Bowel sounds are normal.      Palpations: Abdomen is soft. There is no mass. Tenderness: There is no abdominal tenderness.    Psychiatric:         Mood and Affect: Mood normal.         Behavior: Behavior normal.       Visit Vitals  /70   Pulse 87   Temp 98.4 °F (36.9 °C) (Temporal)   Resp 12   Ht 5' 9.5\" (1.765 m)   Wt 197 lb (89.4 kg)   SpO2 98%   BMI 28.67 kg/m²       Elsa Fernando MD

## 2022-01-04 NOTE — TELEPHONE ENCOUNTER
Called Sarah Motta to schedule appointment for Haresh BECKER to return call. Show Additional Area 1: Yes Periorbital Skin Units: 40 Left Periorbital Skin Units: 0 Show Right And Left Pupillary Line Units: No Dilution (U/0.1 Cc): 10 Consent: Written consent obtained. Risks include but not limited to lid/brow ptosis, bruising, swelling, diplopia, temporary effect, incomplete chemical denervation. Detail Level: Detailed Price (Use Numbers Only, No Special Characters Or $): 297 Post-Care Instructions: Patient instructed to not lie down for 4 hours and limit physical activity for 24 hours. Patient instructed not to travel by airplane for 48 hours. Glabellar Complex Units: 50

## 2022-01-11 ENCOUNTER — NURSE TRIAGE (OUTPATIENT)
Dept: OTHER | Facility: CLINIC | Age: 37
End: 2022-01-11

## 2022-01-11 NOTE — TELEPHONE ENCOUNTER
Received call from Nanda at [facility] with Red Flag Complaint. Subjective: Caller states \"abdominal pain\"     Current Symptoms: pain on right side into groin, started yesterday in side, worse today. Denies any urinary symptoms. Pain into testicles,     Onset: 1 day ago; sudden    Associated Symptoms: sligh nausea    Pain Severity: 7/10; sharp; constant    Temperature: denies       What has been tried: OTC pain med    LMP: NA Pregnant: NA    Recommended disposition: go to ER    Care advice provided, patient verbalizes understanding; denies any other questions or concerns; instructed to call back for any new or worsening symptoms. Patient reports he will go to ER immediately if he begins to vomit or pain gets any worse, otherwise will go when he gets off work    Attention Provider: Thank you for allowing me to participate in the care of your patient. The patient was connected to triage in response to information provided to the [facility]. Please do not respond through this encounter as the response is not directed to a shared pool.       Reason for Disposition   Pain in scrotum persists > 1 hour    Protocols used: ABDOMINAL PAIN - MALE-ADULT-[place]

## 2022-01-12 ENCOUNTER — OFFICE VISIT (OUTPATIENT)
Dept: INTERNAL MEDICINE CLINIC | Age: 37
End: 2022-01-12
Payer: COMMERCIAL

## 2022-01-12 VITALS
HEART RATE: 80 BPM | HEIGHT: 70 IN | RESPIRATION RATE: 18 BRPM | SYSTOLIC BLOOD PRESSURE: 119 MMHG | TEMPERATURE: 98.2 F | WEIGHT: 202.4 LBS | DIASTOLIC BLOOD PRESSURE: 79 MMHG | OXYGEN SATURATION: 98 % | BODY MASS INDEX: 28.98 KG/M2

## 2022-01-12 DIAGNOSIS — R10.11 RIGHT UPPER QUADRANT ABDOMINAL PAIN: ICD-10-CM

## 2022-01-12 DIAGNOSIS — R30.0 DYSURIA: ICD-10-CM

## 2022-01-12 DIAGNOSIS — R31.9 HEMATURIA, UNSPECIFIED TYPE: Primary | ICD-10-CM

## 2022-01-12 DIAGNOSIS — R10.9 RIGHT FLANK PAIN: ICD-10-CM

## 2022-01-12 LAB
BILIRUB UR QL STRIP: NEGATIVE
GLUCOSE UR-MCNC: NEGATIVE MG/DL
KETONES P FAST UR STRIP-MCNC: NEGATIVE MG/DL
PH UR STRIP: 5.5 [PH] (ref 4.6–8)
PROT UR QL STRIP: NEGATIVE
SP GR UR STRIP: 1.03 (ref 1–1.03)
UA UROBILINOGEN AMB POC: NORMAL (ref 0.2–1)
URINALYSIS CLARITY POC: CLEAR
URINALYSIS COLOR POC: YELLOW
URINE BLOOD POC: NORMAL
URINE LEUKOCYTES POC: NEGATIVE
URINE NITRITES POC: NEGATIVE

## 2022-01-12 PROCEDURE — 99213 OFFICE O/P EST LOW 20 MIN: CPT | Performed by: NURSE PRACTITIONER

## 2022-01-12 PROCEDURE — 81003 URINALYSIS AUTO W/O SCOPE: CPT | Performed by: NURSE PRACTITIONER

## 2022-01-12 NOTE — PROGRESS NOTES
HISTORY OF PRESENT ILLNESS  Kai Brown is a 39 y.o. male. Patient reports right upper abdominal pain starting 2 days ago lasting 2 hours then resolved. Yesterday, the pain started around 10 am and radiated to right flank and down towards groin at times. Pain lasted most of the day. He did not eat breakfast. It was worse with walking. No nausea, vomiting, or diarrhea. He had a normal bowel movement this morning. He took motrin yesterday with no relief. He also had dysuria yesterday, but none today. No pain today. Past Surgical History:   Procedure Laterality Date    HX LAP CHOLECYSTECTOMY  02/24/2017    by Dr. Vickie Spurling       Visit Vitals  /79   Pulse 80   Temp 98.2 °F (36.8 °C) (Temporal)   Resp 18   Ht 5' 9.5\" (1.765 m)   Wt 202 lb 6.4 oz (91.8 kg)   SpO2 98%   BMI 29.46 kg/m²       HPI    Review of Systems   Gastrointestinal: Positive for abdominal pain. Negative for diarrhea, nausea and vomiting. Physical Exam  Constitutional:       Appearance: Normal appearance. Abdominal:      General: Abdomen is flat. Bowel sounds are normal. There is no distension. Palpations: Abdomen is soft. Tenderness: There is no abdominal tenderness. Skin:     General: Skin is warm and dry. Neurological:      General: No focal deficit present. Mental Status: He is alert and oriented to person, place, and time.    Psychiatric:         Mood and Affect: Mood normal.         Behavior: Behavior normal.       Results for orders placed or performed in visit on 01/12/22   AMB POC URINALYSIS DIP STICK AUTO W/O MICRO   Result Value Ref Range    Color (UA POC) Yellow     Clarity (UA POC) Clear     Glucose (UA POC) Negative Negative    Bilirubin (UA POC) Negative Negative    Ketones (UA POC) Negative Negative    Specific gravity (UA POC) 1.030 1.001 - 1.035    Blood (UA POC) 2+ Negative    pH (UA POC) 5.5 4.6 - 8.0    Protein (UA POC) Negative Negative    Urobilinogen (UA POC) 0.2 mg/dL 0.2 - 1    Nitrites (UA POC) Negative Negative    Leukocyte esterase (UA POC) Negative Negative         ASSESSMENT and PLAN    ICD-10-CM ICD-9-CM    1. Hematuria, unspecified type  R31.9 599.70 CULTURE, URINE      CULTURE, URINE      CANCELED: CULTURE, URINE      CANCELED: CULTURE, URINE   2. Dysuria  R30.0 788.1 AMB POC URINALYSIS DIP STICK AUTO W/O MICRO   3. Right flank pain  R10.9 789.09    4.  Right upper quadrant abdominal pain  R10.11 789.01      Orders Placed This Encounter    CULTURE, URINE    AMB POC URINALYSIS DIP STICK AUTO W/O MICRO   likely a kidney stone  Encourage hydration  Will defer imaging at this time  Follow-up if symptoms return or worsen and may consider imaging  Urine culture ordered due to dysuria

## 2022-01-14 LAB
BACTERIA UR CULT: NORMAL
SPECIMEN STATUS REPORT, ROLRST: NORMAL

## 2022-05-03 ENCOUNTER — OFFICE VISIT (OUTPATIENT)
Dept: INTERNAL MEDICINE CLINIC | Age: 37
End: 2022-05-03
Payer: COMMERCIAL

## 2022-05-03 VITALS
DIASTOLIC BLOOD PRESSURE: 73 MMHG | TEMPERATURE: 98.4 F | HEART RATE: 90 BPM | BODY MASS INDEX: 28.95 KG/M2 | RESPIRATION RATE: 18 BRPM | WEIGHT: 202.2 LBS | OXYGEN SATURATION: 99 % | HEIGHT: 70 IN | SYSTOLIC BLOOD PRESSURE: 124 MMHG

## 2022-05-03 DIAGNOSIS — E66.3 OVERWEIGHT: ICD-10-CM

## 2022-05-03 DIAGNOSIS — F41.9 ANXIETY: ICD-10-CM

## 2022-05-03 DIAGNOSIS — Z23 ENCOUNTER FOR IMMUNIZATION: ICD-10-CM

## 2022-05-03 DIAGNOSIS — Z00.00 ROUTINE PHYSICAL EXAMINATION: Primary | ICD-10-CM

## 2022-05-03 DIAGNOSIS — F33.42 RECURRENT MAJOR DEPRESSIVE DISORDER, IN FULL REMISSION (HCC): ICD-10-CM

## 2022-05-03 DIAGNOSIS — Z72.0 TOBACCO USE: ICD-10-CM

## 2022-05-03 PROCEDURE — 90732 PPSV23 VACC 2 YRS+ SUBQ/IM: CPT | Performed by: INTERNAL MEDICINE

## 2022-05-03 PROCEDURE — 99395 PREV VISIT EST AGE 18-39: CPT | Performed by: INTERNAL MEDICINE

## 2022-05-03 PROCEDURE — 90471 IMMUNIZATION ADMIN: CPT | Performed by: INTERNAL MEDICINE

## 2022-05-03 RX ORDER — BUPROPION HYDROCHLORIDE 150 MG/1
TABLET ORAL
Qty: 90 TABLET | Refills: 3 | Status: SHIPPED | OUTPATIENT
Start: 2022-05-03

## 2022-05-03 RX ORDER — PAROXETINE HYDROCHLORIDE 20 MG/1
TABLET, FILM COATED ORAL
Qty: 180 TABLET | Refills: 3 | Status: SHIPPED | OUTPATIENT
Start: 2022-05-03

## 2022-05-03 NOTE — PATIENT INSTRUCTIONS
Well Visit, Ages 25 to 48: Care Instructions  Overview     Well visits can help you stay healthy. Your doctor has checked your overall health and may have suggested ways to take good care of yourself. Your doctor also may have recommended tests. At home, you can help prevent illness with healthy eating, regular exercise, and other steps. Follow-up care is a key part of your treatment and safety. Be sure to make and go to all appointments, and call your doctor if you are having problems. It's also a good idea to know your test results and keep a list of the medicines you take. How can you care for yourself at home? · Get screening tests that you and your doctor decide on. Screening helps find diseases before any symptoms appear. · Eat healthy foods. Choose fruits, vegetables, whole grains, protein, and low-fat dairy foods. Limit fat, especially saturated fat. Reduce salt in your diet. · Limit alcohol. If you are a man, have no more than 2 drinks a day or 14 drinks a week. If you are a woman, have no more than 1 drink a day or 7 drinks a week. · Get at least 30 minutes of physical activity on most days of the week. Walking is a good choice. You also may want to do other activities, such as running, swimming, cycling, or playing tennis or team sports. Discuss any changes in your exercise program with your doctor. · Reach and stay at a healthy weight. This will lower your risk for many problems, such as obesity, diabetes, heart disease, and high blood pressure. · Do not smoke or allow others to smoke around you. If you need help quitting, talk to your doctor about stop-smoking programs and medicines. These can increase your chances of quitting for good. · Care for your mental health. It is easy to get weighed down by worry and stress. Learn strategies to manage stress, like deep breathing and mindfulness, and stay connected with your family and community.  If you find you often feel sad or hopeless, talk with your doctor. Treatment can help. · Talk to your doctor about whether you have any risk factors for sexually transmitted infections (STIs). You can help prevent STIs if you wait to have sex with a new partner (or partners) until you've each been tested for STIs. It also helps if you use condoms (male or female condoms) and if you limit your sex partners to one person who only has sex with you. Vaccines are available for some STIs, such as HPV. · Use birth control if it's important to you to prevent pregnancy. Talk with your doctor about the choices available and what might be best for you. · If you think you may have a problem with alcohol or drug use, talk to your doctor. This includes prescription medicines (such as amphetamines and opioids) and illegal drugs (such as cocaine and methamphetamine). Your doctor can help you figure out what type of treatment is best for you. · Protect your skin from too much sun. When you're outdoors from 10 a.m. to 4 p.m., stay in the shade or cover up with clothing and a hat with a wide brim. Wear sunglasses that block UV rays. Even when it's cloudy, put broad-spectrum sunscreen (SPF 30 or higher) on any exposed skin. · See a dentist one or two times a year for checkups and to have your teeth cleaned. · Wear a seat belt in the car. When should you call for help? Watch closely for changes in your health, and be sure to contact your doctor if you have any problems or symptoms that concern you. Where can you learn more? Go to http://www.[employer].com/  Enter P072 in the search box to learn more about \"Well Visit, Ages 25 to 48: Care Instructions. \"  Current as of: October 6, 2021               Content Version: 13.2  © 6191-9232 [employer], [employer]. Care instructions adapted under license by [employer] (which disclaims liability or warranty for this information).  If you have questions about a medical condition or this instruction, always ask your healthcare professional. [place] [address] any warranty or liability for your use of this information.

## 2022-05-03 NOTE — ASSESSMENT & PLAN NOTE
Stable, asymptomatic, he was counseled on the dangers of tobacco use. He was advised to quit and reluctant to quit. Reviewed strategies to maximize success, including removing cigarettes and smoking materials from environment, stress management and substitution of other forms of reinforcement.

## 2022-05-03 NOTE — PROGRESS NOTES
Sherie Ramírez is a 39 y.o. male who was seen in clinic today (5/3/2022) for a physical.            Assessment & Plan:   Below is the assessment and plan developed based on review of pertinent history, physical exam, labs, studies, and medications. 1. Routine physical examination  -     METABOLIC PANEL, COMPREHENSIVE; Future  -     CBC W/O DIFF; Future  -     LIPID PANEL; Future  -     HEMOGLOBIN A1C WITH EAG; Future  2. Encounter for immunization  -     PNEUMOCOCCAL POLYSACCHARIDE VACCINE, 23-VALENT, ADULT OR IMMUNOSUPPRESSED PT DOSE,  3. Recurrent major depressive disorder, in full remission ([facility] [address]  Assessment & Plan:  well controlled, continue current treatment, will try to space out appointments to once/yr   Orders:  -     buPROPion XL (WELLBUTRIN XL) 150 mg tablet; 1 tab PO daily. , Normal, Disp-90 Tablet, R-3  -     PARoxetine (PAXIL) 20 mg tablet; TAKE ONE TABLET BY MOUTH TWICE A DAY, Normal, Disp-180 Tablet, R-3  4. Anxiety  Assessment & Plan:  well controlled, continue current treatment, will try to space out appointments to once/yr    Orders:  -     buPROPion XL (WELLBUTRIN XL) 150 mg tablet; 1 tab PO daily. , Normal, Disp-90 Tablet, R-3  -     PARoxetine (PAXIL) 20 mg tablet; TAKE ONE TABLET BY MOUTH TWICE A DAY, Normal, Disp-180 Tablet, R-3  5. Tobacco use  Assessment & Plan:  Stable, asymptomatic, he was counseled on the dangers of tobacco use. He was advised to quit and reluctant to quit. Reviewed strategies to maximize success, including removing cigarettes and smoking materials from environment, stress management and substitution of other forms of reinforcement. 6. Overweight  Comments:  worsening, up 10 lbs in the last year, getting close to obesity range, recommended diet changes, he is active at work but try formal exercise    Follow-up and Dispositions    · Return in about 1 year (around 5/3/2023) for FULL PHYSICAL - 15 minutes.          Subjective:   Sabra Rivera is here today for a full physical. Since last visit: weight has increased    Health Maintenance  Immunizations:   Covid: not up to date - he will look into getting booster  Influenza: declined  Tetanus: up to date     Cancer screening:    Reviewed testicular, prostate, and colon cancer screening guidelines. The following acute and/or chronic problems were addressed today:    Mental Health Review  Patient is seen for anxiety & depression. Any available GAD7 and PHQ9 reviewed. Reports experiences the following side effects from the treatment: none. The following sections were reviewed & updated as appropriate: Problem List, Allergies, PMH, PSH, FH, and SH. Prior to Admission medications    Medication Sig Start Date End Date Taking? Authorizing Provider   buPROPion XL (WELLBUTRIN XL) 150 mg tablet 1 tab PO daily. 11/1/21  Yes Milagro Ling MD   PARoxetine (PAXIL) 20 mg tablet TAKE ONE TABLET BY MOUTH TWICE A DAY 11/1/21  Yes Milagro Ling MD          Review of Systems   Constitutional: Negative for chills and fever. Respiratory: Negative for cough and shortness of breath. Cardiovascular: Negative for chest pain and palpitations. Gastrointestinal: Negative for abdominal pain, blood in stool, constipation, diarrhea, heartburn, nausea and vomiting. Musculoskeletal: Negative for joint pain and myalgias. Neurological: Negative for tingling, focal weakness and headaches. Endo/Heme/Allergies: Does not bruise/bleed easily. Psychiatric/Behavioral: Negative for depression. The patient is not nervous/anxious and does not have insomnia. Subjective:   Physical Exam  Constitutional:       General: He is not in acute distress. Appearance: He is well-developed. HENT:      Right Ear: Tympanic membrane and ear canal normal.      Left Ear: Tympanic membrane and ear canal normal.   Eyes:      Conjunctiva/sclera: Conjunctivae normal.   Cardiovascular:      Rate and Rhythm: Regular rhythm.       Heart sounds: Normal heart sounds. No murmur heard. Pulmonary:      Effort: Pulmonary effort is normal.      Breath sounds: Normal breath sounds. Abdominal:      General: Bowel sounds are normal.      Palpations: Abdomen is soft. Tenderness: There is no abdominal tenderness. Musculoskeletal:      Right lower leg: No edema. Left lower leg: No edema. Lymphadenopathy:      Cervical: No cervical adenopathy.    Psychiatric:         Mood and Affect: Mood and affect normal.          Visit Vitals  /73 (BP 1 Location: Left upper arm, BP Patient Position: Sitting, BP Cuff Size: Small adult)   Pulse 90   Temp 98.4 °F (36.9 °C) (Oral)   Resp 18   Ht 5' 9.5\" (1.765 m)   Wt 202 lb 3.2 oz (91.7 kg)   SpO2 99%   BMI 29.43 kg/m²          Ricky Adair MD

## 2022-05-03 NOTE — PROGRESS NOTES
Chief Complaint   Patient presents with    Complete Physical     3 most recent PHQ Screens 5/3/2022   Little interest or pleasure in doing things Not at all   Feeling down, depressed, irritable, or hopeless Not at all   Total Score PHQ 2 0   Trouble falling or staying asleep, or sleeping too much Not at all   Feeling tired or having little energy Not at all   Poor appetite, weight loss, or overeating Not at all   Feeling bad about yourself - or that you are a failure or have let yourself or your family down Not at all   Trouble concentrating on things such as school, work, reading, or watching TV Not at all   Moving or speaking so slowly that other people could have noticed; or the opposite being so fidgety that others notice Not at all   Thoughts of being better off dead, or hurting yourself in some way Not at all   PHQ 9 Score 0   How difficult have these problems made it for you to do your work, take care of your home and get along with others -     Abuse Screening Questionnaire 5/3/2022   Do you ever feel afraid of your partner? N   Are you in a relationship with someone who physically or mentally threatens you? N   Is it safe for you to go home? Y     Visit Vitals  /73 (BP 1 Location: Left upper arm, BP Patient Position: Sitting, BP Cuff Size: Small adult)   Pulse 90   Temp 98.4 °F (36.9 °C) (Oral)   Resp 18   Ht 5' 9.5\" (1.765 m)   Wt 202 lb 3.2 oz (91.7 kg)   SpO2 99%   BMI 29.43 kg/m²     1. \"Have you been to the ER, urgent care clinic since your last visit? Hospitalized since your last visit? \" no    2. \"Have you seen or consulted any other health care providers outside of the [address] [place] [address] since your last visit? \" no

## 2022-05-03 NOTE — PROGRESS NOTES
Immunization/s administered 5/3/2022 by Ailyn Mccartney LPN with patient's consent. Patient tolerated procedure well. No reactions noted.

## 2022-06-07 DIAGNOSIS — Z00.00 ROUTINE PHYSICAL EXAMINATION: ICD-10-CM

## 2022-06-07 NOTE — LETTER
6/14/2022 7:43 AM    Mr. Brook Paz  [address] [id number]          We noticed that you have an unread [facility] message. Please login to you account to see full results. Fuat, all your labs are stable or at goal for you, except for your cholesterol and your sugar.  Your glucose (blood sugar) is still just a bit high.  No medications but please please please work on the diet and weight.  Your cholesterol has significantly gone up over the last 10 years. It was high last year but is very high this year.  I think this is mostly genetic.  I don't think this is all diet related.  I think it is bad enough that you need to start a cholesterol medication.  I would recommend atorvastatin (generic lipitor) or rosuvastatin (generic crestor).  Let me know where you want me to send this into.  Biggest side effects are muscle aches and if it happens stop it and let me know.  Most people tolerate them well though.       Sincerely,      Taran Bhatt MD

## 2022-06-08 LAB
ALBUMIN SERPL-MCNC: 4 G/DL (ref 3.5–5)
ALBUMIN/GLOB SERPL: 1.2 {RATIO} (ref 1.1–2.2)
ALP SERPL-CCNC: 127 U/L (ref 45–117)
ALT SERPL-CCNC: 43 U/L (ref 12–78)
ANION GAP SERPL CALC-SCNC: 3 MMOL/L (ref 5–15)
AST SERPL-CCNC: 22 U/L (ref 15–37)
BILIRUB SERPL-MCNC: 0.3 MG/DL (ref 0.2–1)
BUN SERPL-MCNC: 14 MG/DL (ref 6–20)
BUN/CREAT SERPL: 16 (ref 12–20)
CALCIUM SERPL-MCNC: 9.4 MG/DL (ref 8.5–10.1)
CHLORIDE SERPL-SCNC: 105 MMOL/L (ref 97–108)
CHOLEST SERPL-MCNC: 294 MG/DL
CO2 SERPL-SCNC: 30 MMOL/L (ref 21–32)
CREAT SERPL-MCNC: 0.9 MG/DL (ref 0.7–1.3)
ERYTHROCYTE [DISTWIDTH] IN BLOOD BY AUTOMATED COUNT: 12.9 % (ref 11.5–14.5)
EST. AVERAGE GLUCOSE BLD GHB EST-MCNC: 114 MG/DL
GLOBULIN SER CALC-MCNC: 3.3 G/DL (ref 2–4)
GLUCOSE SERPL-MCNC: 108 MG/DL (ref 65–100)
HBA1C MFR BLD: 5.6 % (ref 4–5.6)
HCT VFR BLD AUTO: 48.4 % (ref 36.6–50.3)
HDLC SERPL-MCNC: 34 MG/DL
HDLC SERPL: 8.6 {RATIO} (ref 0–5)
HGB BLD-MCNC: 16 G/DL (ref 12.1–17)
LDLC SERPL CALC-MCNC: 205.2 MG/DL (ref 0–100)
MCH RBC QN AUTO: 30.9 PG (ref 26–34)
MCHC RBC AUTO-ENTMCNC: 33.1 G/DL (ref 30–36.5)
MCV RBC AUTO: 93.6 FL (ref 80–99)
NRBC # BLD: 0.18 K/UL (ref 0–0.01)
NRBC BLD-RTO: 2.3 PER 100 WBC
PLATELET # BLD AUTO: 337 K/UL (ref 150–400)
PMV BLD AUTO: 9.8 FL (ref 8.9–12.9)
POTASSIUM SERPL-SCNC: 4.7 MMOL/L (ref 3.5–5.1)
PROT SERPL-MCNC: 7.3 G/DL (ref 6.4–8.2)
RBC # BLD AUTO: 5.17 M/UL (ref 4.1–5.7)
SODIUM SERPL-SCNC: 138 MMOL/L (ref 136–145)
TRIGL SERPL-MCNC: 274 MG/DL (ref ?–150)
VLDLC SERPL CALC-MCNC: 54.8 MG/DL
WBC # BLD AUTO: 8 K/UL (ref 4.1–11.1)

## 2022-06-09 PROBLEM — E78.00 PURE HYPERCHOLESTEROLEMIA: Status: ACTIVE | Noted: 2022-06-09

## 2022-06-09 NOTE — PROGRESS NOTES
Results released to patient via [employer]. All labs are stable or at goal for him, except for worsening lipids. Progressively getting worse, will recommend statin.

## 2022-07-06 ENCOUNTER — PATIENT MESSAGE (OUTPATIENT)
Dept: INTERNAL MEDICINE CLINIC | Age: 37
End: 2022-07-06

## 2022-07-07 DIAGNOSIS — E78.00 PURE HYPERCHOLESTEROLEMIA: Primary | ICD-10-CM

## 2022-07-07 RX ORDER — ATORVASTATIN CALCIUM 10 MG/1
10 TABLET, FILM COATED ORAL DAILY
Qty: 90 TABLET | Refills: 0 | Status: SHIPPED | OUTPATIENT
Start: 2022-07-07 | End: 2022-10-06

## 2022-07-28 NOTE — TELEPHONE ENCOUNTER
From: Naomi Haynes  To: Lucina Elena NP  Sent: 2022 4:53 PM EDT  Subject: Referral    Hi Ma'am, this is  of Crissy Reis her  is [phone]. we were there yesterday for my wife because  she has a hair loose on her head, and you referred her dermatology which is located upstairs. When I called them today, they were able to give us an appointment for three months later, I do not think my wife should wait that long, and when I told them this, they told me to get in touch with you to let you know if we want to get in earlier then that time, you need to get in touch with them to ask them to get us in. I do not understand why they can just give us the earlier date instead they want you to call them. would you please get in touch with them to be able to get us in earlier!     Thank you,    Naomi Haynes

## 2022-10-06 DIAGNOSIS — E78.00 PURE HYPERCHOLESTEROLEMIA: ICD-10-CM

## 2022-10-06 RX ORDER — ATORVASTATIN CALCIUM 10 MG/1
TABLET, FILM COATED ORAL
Qty: 30 TABLET | Refills: 0 | Status: SHIPPED | OUTPATIENT
Start: 2022-10-06 | End: 2022-10-12 | Stop reason: SDUPTHER

## 2022-10-06 NOTE — TELEPHONE ENCOUNTER
Please call patient. Needs labs since starting statin. Should be fasting. Needs to use LC, orders placed already.

## 2022-10-12 DIAGNOSIS — E78.00 PURE HYPERCHOLESTEROLEMIA: ICD-10-CM

## 2022-10-12 LAB
ALBUMIN SERPL-MCNC: 4.6 G/DL (ref 4–5)
ALP SERPL-CCNC: 132 IU/L (ref 44–121)
ALT SERPL-CCNC: 20 IU/L (ref 0–44)
AST SERPL-CCNC: 21 IU/L (ref 0–40)
BILIRUB DIRECT SERPL-MCNC: <0.1 MG/DL (ref 0–0.4)
BILIRUB SERPL-MCNC: 0.2 MG/DL (ref 0–1.2)
CHOLEST SERPL-MCNC: 194 MG/DL (ref 100–199)
HDLC SERPL-MCNC: 34 MG/DL
LDLC SERPL CALC-MCNC: 137 MG/DL (ref 0–99)
PROT SERPL-MCNC: 7 G/DL (ref 6–8.5)
TRIGL SERPL-MCNC: 129 MG/DL (ref 0–149)
VLDLC SERPL CALC-MCNC: 23 MG/DL (ref 5–40)

## 2022-10-12 RX ORDER — ATORVASTATIN CALCIUM 10 MG/1
10 TABLET, FILM COATED ORAL DAILY
Qty: 90 TABLET | Refills: 2 | Status: SHIPPED | OUTPATIENT
Start: 2022-10-12

## 2022-10-12 NOTE — PROGRESS NOTES
Results released to patient via [employer]. Lipids improved on statin. LFT's stable. Medication refilled.

## 2023-05-03 ENCOUNTER — OFFICE VISIT (OUTPATIENT)
Dept: INTERNAL MEDICINE CLINIC | Age: 38
End: 2023-05-03
Payer: COMMERCIAL

## 2023-05-03 VITALS
HEIGHT: 70 IN | BODY MASS INDEX: 27.92 KG/M2 | RESPIRATION RATE: 18 BRPM | DIASTOLIC BLOOD PRESSURE: 82 MMHG | HEART RATE: 88 BPM | SYSTOLIC BLOOD PRESSURE: 117 MMHG | WEIGHT: 195 LBS | OXYGEN SATURATION: 98 %

## 2023-05-03 DIAGNOSIS — E78.00 PURE HYPERCHOLESTEROLEMIA: ICD-10-CM

## 2023-05-03 DIAGNOSIS — F33.42 RECURRENT MAJOR DEPRESSIVE DISORDER, IN FULL REMISSION (HCC): ICD-10-CM

## 2023-05-03 DIAGNOSIS — F41.9 ANXIETY: ICD-10-CM

## 2023-05-03 DIAGNOSIS — Z72.0 TOBACCO USE: ICD-10-CM

## 2023-05-03 DIAGNOSIS — Z00.00 ROUTINE PHYSICAL EXAMINATION: Primary | ICD-10-CM

## 2023-05-03 PROCEDURE — 99395 PREV VISIT EST AGE 18-39: CPT | Performed by: INTERNAL MEDICINE

## 2023-05-03 PROCEDURE — 99213 OFFICE O/P EST LOW 20 MIN: CPT | Performed by: INTERNAL MEDICINE

## 2023-05-03 RX ORDER — PAROXETINE HYDROCHLORIDE 20 MG/1
30 TABLET, FILM COATED ORAL 2 TIMES DAILY
Qty: 180 TABLET | Refills: 0 | Status: SHIPPED | OUTPATIENT
Start: 2023-05-03 | End: 2023-05-03 | Stop reason: SDUPTHER

## 2023-05-03 RX ORDER — PAROXETINE HYDROCHLORIDE 20 MG/1
30 TABLET, FILM COATED ORAL 2 TIMES DAILY
Qty: 270 TABLET | Refills: 0 | Status: SHIPPED | OUTPATIENT
Start: 2023-05-03

## 2023-05-03 RX ORDER — LORAZEPAM 1 MG/1
1 TABLET ORAL
Qty: 10 TABLET | Refills: 0 | Status: SHIPPED | OUTPATIENT
Start: 2023-05-03

## 2023-05-12 DIAGNOSIS — Z00.00 ENCOUNTER FOR GENERAL ADULT MEDICAL EXAMINATION WITHOUT ABNORMAL FINDINGS: ICD-10-CM

## 2023-05-12 DIAGNOSIS — Z00.00 ENCOUNTER FOR GENERAL ADULT MEDICAL EXAMINATION WITHOUT ABNORMAL FINDINGS: Primary | ICD-10-CM

## 2023-05-12 DIAGNOSIS — E78.00 PURE HYPERCHOLESTEROLEMIA, UNSPECIFIED: ICD-10-CM

## 2023-05-12 DIAGNOSIS — E78.00 PURE HYPERCHOLESTEROLEMIA, UNSPECIFIED: Primary | ICD-10-CM

## 2023-05-18 ENCOUNTER — PATIENT MESSAGE (OUTPATIENT)
Age: 38
End: 2023-05-18

## 2023-05-21 DIAGNOSIS — F41.9 ANXIETY: Primary | ICD-10-CM

## 2023-05-21 RX ORDER — LORAZEPAM 1 MG/1
1-1.5 TABLET ORAL DAILY PRN
Qty: 10 TABLET | Refills: 0 | Status: SHIPPED | OUTPATIENT
Start: 2023-05-21 | End: 2023-05-31

## 2023-05-21 RX ORDER — PAROXETINE HYDROCHLORIDE 20 MG/1
30 TABLET, FILM COATED ORAL 2 TIMES DAILY
COMMUNITY
Start: 2023-05-03

## 2023-05-21 RX ORDER — LORAZEPAM 1 MG/1
1 TABLET ORAL EVERY 6 HOURS PRN
COMMUNITY
Start: 2023-05-03 | End: 2023-05-21 | Stop reason: SDUPTHER

## 2023-05-21 RX ORDER — BUPROPION HYDROCHLORIDE 150 MG/1
TABLET ORAL
COMMUNITY
Start: 2022-05-03

## 2023-05-21 RX ORDER — ATORVASTATIN CALCIUM 10 MG/1
10 TABLET, FILM COATED ORAL DAILY
COMMUNITY
Start: 2022-10-12

## 2023-05-22 NOTE — TELEPHONE ENCOUNTER
Spoke with patient giving message from Dr Jeffrey Castillo. Stated he has cut out fried foods, sugar, and dropped to 2 meals a day. Patient states surprised at results. Encouraged to make a follow up appt for 3 months as schedule fills quickly. Patient verbalized understanding.

## 2023-06-14 LAB
ALBUMIN SERPL-MCNC: 4.6 G/DL (ref 3.5–5)
ALBUMIN/GLOB SERPL: 1.3 (ref 1.1–2.2)
ALP SERPL-CCNC: 140 U/L (ref 45–117)
ALT SERPL-CCNC: 51 U/L (ref 12–78)
ANION GAP SERPL CALC-SCNC: 3 MMOL/L (ref 5–15)
AST SERPL-CCNC: 30 U/L (ref 15–37)
BILIRUB SERPL-MCNC: 0.5 MG/DL (ref 0.2–1)
BUN SERPL-MCNC: 14 MG/DL (ref 6–20)
BUN/CREAT SERPL: 15 (ref 12–20)
CALCIUM SERPL-MCNC: 9.7 MG/DL (ref 8.5–10.1)
CHLORIDE SERPL-SCNC: 103 MMOL/L (ref 97–108)
CHOLEST SERPL-MCNC: 250 MG/DL
CO2 SERPL-SCNC: 30 MMOL/L (ref 21–32)
CREAT SERPL-MCNC: 0.93 MG/DL (ref 0.7–1.3)
ERYTHROCYTE [DISTWIDTH] IN BLOOD BY AUTOMATED COUNT: 12.6 % (ref 11.5–14.5)
GLOBULIN SER CALC-MCNC: 3.5 G/DL (ref 2–4)
GLUCOSE SERPL-MCNC: 91 MG/DL (ref 65–100)
HCT VFR BLD AUTO: 48.3 % (ref 36.6–50.3)
HDLC SERPL-MCNC: 41 MG/DL
HDLC SERPL: 6.1 (ref 0–5)
HGB BLD-MCNC: 15.6 G/DL (ref 12.1–17)
LDLC SERPL CALC-MCNC: 166 MG/DL (ref 0–100)
MCH RBC QN AUTO: 29.5 PG (ref 26–34)
MCHC RBC AUTO-ENTMCNC: 32.3 G/DL (ref 30–36.5)
MCV RBC AUTO: 91.3 FL (ref 80–99)
NRBC # BLD: 0 K/UL (ref 0–0.01)
NRBC BLD-RTO: 0 PER 100 WBC
PLATELET # BLD AUTO: 372 K/UL (ref 150–400)
PMV BLD AUTO: 9.3 FL (ref 8.9–12.9)
POTASSIUM SERPL-SCNC: 4.6 MMOL/L (ref 3.5–5.1)
PROT SERPL-MCNC: 8.1 G/DL (ref 6.4–8.2)
RBC # BLD AUTO: 5.29 M/UL (ref 4.1–5.7)
SODIUM SERPL-SCNC: 136 MMOL/L (ref 136–145)
TRIGL SERPL-MCNC: 215 MG/DL
VLDLC SERPL CALC-MCNC: 43 MG/DL
WBC # BLD AUTO: 9.8 K/UL (ref 4.1–11.1)

## 2023-08-07 ENCOUNTER — OFFICE VISIT (OUTPATIENT)
Age: 38
End: 2023-08-07
Payer: COMMERCIAL

## 2023-08-07 VITALS
DIASTOLIC BLOOD PRESSURE: 74 MMHG | RESPIRATION RATE: 18 BRPM | HEIGHT: 69 IN | SYSTOLIC BLOOD PRESSURE: 122 MMHG | OXYGEN SATURATION: 100 % | WEIGHT: 200 LBS | TEMPERATURE: 97.3 F | BODY MASS INDEX: 29.62 KG/M2 | HEART RATE: 95 BPM

## 2023-08-07 DIAGNOSIS — F33.41 RECURRENT MAJOR DEPRESSIVE DISORDER, IN PARTIAL REMISSION (HCC): ICD-10-CM

## 2023-08-07 DIAGNOSIS — F41.9 ANXIETY: ICD-10-CM

## 2023-08-07 PROCEDURE — 99213 OFFICE O/P EST LOW 20 MIN: CPT | Performed by: INTERNAL MEDICINE

## 2023-08-07 RX ORDER — ARIPIPRAZOLE 10 MG/1
10 TABLET ORAL DAILY
COMMUNITY

## 2023-08-07 RX ORDER — CLONAZEPAM 0.5 MG/1
0.5 TABLET, ORALLY DISINTEGRATING ORAL DAILY PRN
COMMUNITY
Start: 2023-07-25

## 2023-08-07 SDOH — ECONOMIC STABILITY: HOUSING INSECURITY
IN THE LAST 12 MONTHS, WAS THERE A TIME WHEN YOU DID NOT HAVE A STEADY PLACE TO SLEEP OR SLEPT IN A SHELTER (INCLUDING NOW)?: NO

## 2023-08-07 SDOH — ECONOMIC STABILITY: INCOME INSECURITY: HOW HARD IS IT FOR YOU TO PAY FOR THE VERY BASICS LIKE FOOD, HOUSING, MEDICAL CARE, AND HEATING?: NOT HARD AT ALL

## 2023-08-07 SDOH — ECONOMIC STABILITY: FOOD INSECURITY: WITHIN THE PAST 12 MONTHS, THE FOOD YOU BOUGHT JUST DIDN'T LAST AND YOU DIDN'T HAVE MONEY TO GET MORE.: NEVER TRUE

## 2023-08-07 SDOH — ECONOMIC STABILITY: FOOD INSECURITY: WITHIN THE PAST 12 MONTHS, YOU WORRIED THAT YOUR FOOD WOULD RUN OUT BEFORE YOU GOT MONEY TO BUY MORE.: NEVER TRUE

## 2023-08-07 ASSESSMENT — ANXIETY QUESTIONNAIRES
IF YOU CHECKED OFF ANY PROBLEMS ON THIS QUESTIONNAIRE, HOW DIFFICULT HAVE THESE PROBLEMS MADE IT FOR YOU TO DO YOUR WORK, TAKE CARE OF THINGS AT HOME, OR GET ALONG WITH OTHER PEOPLE: SOMEWHAT DIFFICULT
5. BEING SO RESTLESS THAT IT IS HARD TO SIT STILL: 1
3. WORRYING TOO MUCH ABOUT DIFFERENT THINGS: 3
7. FEELING AFRAID AS IF SOMETHING AWFUL MIGHT HAPPEN: 0
GAD7 TOTAL SCORE: 16
2. NOT BEING ABLE TO STOP OR CONTROL WORRYING: 3
4. TROUBLE RELAXING: 3
1. FEELING NERVOUS, ANXIOUS, OR ON EDGE: 3
6. BECOMING EASILY ANNOYED OR IRRITABLE: 3

## 2023-08-07 NOTE — PROGRESS NOTES
Reggie Chen is a 44 y.o. male who was seen in clinic today (8/7/2023). Assessment & Plan:   Below is the assessment and plan developed based on review of pertinent history, physical exam, labs, studies, and medications. 1. Recurrent major depressive disorder, in partial remission [facility])  Assessment & Plan:  Improved, he has established with psychiatrist and going forward will defer to her   2. Anxiety  Assessment & Plan:  Improved, he has established with psychiatrist and going forward will defer to her      Return in about 9 months (around 5/7/2024) for FULL PHYSICAL. Subjective/Objective:   Nadine Alonso was seen today for Follow-up and Anxiety     Since last visit: weight is stable. He was able to establish with a psychiatrist.  He has had several medication changes. Mental Health Review  Patient is seen for anxiety & depression. At last visit Paxil increased to max dose and provided some ativan to use as needed. he is now off Wellbutrin & Paxil and on Abilify. He is now on Klonopin as needed, not having any side effects. He feels these medication changes have helped. He feels more even. He reports he can't turn off this thoughts. Any available GAD7 and PHQ9 reviewed. Reports experiences the following side effects from the treatment: none. [facility]  reviewed. I provided Ativan 10 tabs on 5/3 and 5/22. He was given Klonopin on 7/25 by a different provider. She recommended talking to a psychologist but has not established yet. Prior to Admission medications    Medication Sig Start Date End Date Taking? Authorizing Provider   ARIPiprazole (ABILIFY) 10 MG tablet Take 1 tablet by mouth daily   Yes Historical Provider, MD   atorvastatin (LIPITOR) 10 MG tablet Take 1 tablet by mouth daily 10/12/22  Yes Historical Provider, MD   clonazePAM (KLONOPIN) 0.5 MG disintegrating tablet Take 1 tablet by mouth daily as needed.  Max Daily Amount: 0.5 mg 7/25/23   Historical Provider, MD   buPROPion [facility]

## 2023-09-05 DIAGNOSIS — F33.41 RECURRENT MAJOR DEPRESSIVE DISORDER, IN PARTIAL REMISSION (HCC): Primary | ICD-10-CM

## 2023-09-05 DIAGNOSIS — F41.9 ANXIETY: ICD-10-CM

## 2023-09-05 RX ORDER — PAROXETINE HYDROCHLORIDE 20 MG/1
TABLET, FILM COATED ORAL
Qty: 30 TABLET | Refills: 0 | Status: SHIPPED | OUTPATIENT
Start: 2023-09-05

## 2023-09-17 RX ORDER — ATORVASTATIN CALCIUM 10 MG/1
10 TABLET, FILM COATED ORAL DAILY
Qty: 30 TABLET | Refills: 2 | Status: SHIPPED | OUTPATIENT
Start: 2023-09-17

## 2024-03-14 ENCOUNTER — TELEPHONE (OUTPATIENT)
Age: 39
End: 2024-03-14

## 2024-03-14 NOTE — TELEPHONE ENCOUNTER
----- Message from LaLuz Coles sent at 3/14/2024  4:47 PM EDT -----  Subject: Referral Request    Reason for referral request? Orders for A EKG  Provider patient wants to be referred to(if known):     Provider Phone Number(if known):    Additional Information for Provider? Pt has been having chest pains and   goes to back, at least 2 to 3x and Pt gets pale. No chest at the moment   03/14/24. Pt was told by his Psychiatrist to have a EKG scheduled  ---------------------------------------------------------------------------  --------------  CALL BACK INFO    [phone]; OK to leave message on voicemail,OK to respond with electronic   message via [employer] portal (only for patients who have registered [employer]   account)  ---------------------------------------------------------------------------  --------------

## 2024-03-14 NOTE — TELEPHONE ENCOUNTER
Returned call to patient, ID x 2.   Patient complains of chest pain left chest and left jaw  How long have you had this pain 1 week  Patient describes the pain as intermittent   Pain intensity sharp  Does pain radiate radiates to the left arm  Aggravating factors none  Alleviating factors none  Did pain wake patient up form sleep No  Shortness of breath present Yes  Nausea/Vomiting No  Sweating Yes  Diabetic No  History of heart/lung disease No  Have you had these feelings before no  Patient Active Problem List   Diagnosis    Tobacco use    Recurrent major depressive disorder (HCC)    Anxiety    Pure hypercholesterolemia   Advised pt to go ED to be further evaluated. Pt declined, requested to see  first. Explained to pt, our office is currently closed, and  will be back in the office tomorrow. Pt verbalized understanding, requested a call back tomorrow with ' recommendation, advised to schedule appt via [employer] with any of the providers for first available tomorrow if he doesn't go to ED tonight, pt stated he would wait to hear from . Will forward to PCP for review.

## 2024-03-15 NOTE — TELEPHONE ENCOUNTER
Pt is currently scheduled to see NP 3/19/24. Pt had conversation with PCP via [employer].

## 2024-03-19 ENCOUNTER — OFFICE VISIT (OUTPATIENT)
Age: 39
End: 2024-03-19
Payer: COMMERCIAL

## 2024-03-19 VITALS
DIASTOLIC BLOOD PRESSURE: 70 MMHG | OXYGEN SATURATION: 98 % | WEIGHT: 177 LBS | TEMPERATURE: 97.8 F | SYSTOLIC BLOOD PRESSURE: 107 MMHG | RESPIRATION RATE: 16 BRPM | BODY MASS INDEX: 26.22 KG/M2 | HEART RATE: 78 BPM | HEIGHT: 69 IN

## 2024-03-19 DIAGNOSIS — R07.9 CHEST PAIN, UNSPECIFIED TYPE: Primary | ICD-10-CM

## 2024-03-19 DIAGNOSIS — F41.9 ANXIETY: ICD-10-CM

## 2024-03-19 PROCEDURE — 93000 ELECTROCARDIOGRAM COMPLETE: CPT | Performed by: NURSE PRACTITIONER

## 2024-03-19 PROCEDURE — 99213 OFFICE O/P EST LOW 20 MIN: CPT | Performed by: NURSE PRACTITIONER

## 2024-03-19 RX ORDER — CLONAZEPAM 1 MG/1
TABLET ORAL
COMMUNITY
Start: 2024-03-14

## 2024-03-19 RX ORDER — LORAZEPAM 1 MG/1
TABLET ORAL
COMMUNITY
Start: 2023-12-12 | End: 2024-03-19

## 2024-03-19 RX ORDER — SERTRALINE HYDROCHLORIDE 100 MG/1
TABLET, FILM COATED ORAL
COMMUNITY
Start: 2024-03-14

## 2024-03-19 RX ORDER — BUPROPION HYDROCHLORIDE 150 MG/1
TABLET ORAL
COMMUNITY
Start: 2024-03-14

## 2024-03-19 ASSESSMENT — PATIENT HEALTH QUESTIONNAIRE - PHQ9
10. IF YOU CHECKED OFF ANY PROBLEMS, HOW DIFFICULT HAVE THESE PROBLEMS MADE IT FOR YOU TO DO YOUR WORK, TAKE CARE OF THINGS AT HOME, OR GET ALONG WITH OTHER PEOPLE: NOT DIFFICULT AT ALL
4. FEELING TIRED OR HAVING LITTLE ENERGY: NOT AT ALL
SUM OF ALL RESPONSES TO PHQ QUESTIONS 1-9: 0
9. THOUGHTS THAT YOU WOULD BE BETTER OFF DEAD, OR OF HURTING YOURSELF: NOT AT ALL
1. LITTLE INTEREST OR PLEASURE IN DOING THINGS: NOT AT ALL
3. TROUBLE FALLING OR STAYING ASLEEP: NOT AT ALL
6. FEELING BAD ABOUT YOURSELF - OR THAT YOU ARE A FAILURE OR HAVE LET YOURSELF OR YOUR FAMILY DOWN: NOT AT ALL
SUM OF ALL RESPONSES TO PHQ9 QUESTIONS 1 & 2: 0
2. FEELING DOWN, DEPRESSED OR HOPELESS: NOT AT ALL
8. MOVING OR SPEAKING SO SLOWLY THAT OTHER PEOPLE COULD HAVE NOTICED. OR THE OPPOSITE, BEING SO FIGETY OR RESTLESS THAT YOU HAVE BEEN MOVING AROUND A LOT MORE THAN USUAL: NOT AT ALL
7. TROUBLE CONCENTRATING ON THINGS, SUCH AS READING THE NEWSPAPER OR WATCHING TELEVISION: NOT AT ALL
5. POOR APPETITE OR OVEREATING: NOT AT ALL

## 2024-03-19 NOTE — PROGRESS NOTES
Alix Funez (:  1985) is a 38 y.o. male,here for evaluation of the following chief complaint(s):  Chest Pain (Started last 3-4 weeks )    /70   Pulse 78   Temp 97.8 °F (36.6 °C) (Oral)   Resp 16   Ht 1.753 m (5' 9\")   Wt 80.3 kg (177 lb)   SpO2 98%   BMI 26.14 kg/m²       SUBJECTIVE/OBJECTIVE:    HPI:Patient reports 3-4 episodes of chest pain that radiates to left side of chest and to his back over the 3-4 weeks. It lasts about 2-3 minutes. It gets better if someone rubs in the center of the back. He notes difficulty taking a deep breath during the episodes. Episodes happen when he is feeling overwhelmed and stressed. No increase in episodes with activity.  Patient does see a psych NP- More Rodriguez at [facility].    Review of Systems   Cardiovascular:  Positive for chest pain.       Physical Exam  Constitutional:       Appearance: Normal appearance.   Neck:      Vascular: No carotid bruit.   Cardiovascular:      Rate and Rhythm: Normal rate and regular rhythm.   Pulmonary:      Effort: Pulmonary effort is normal.      Breath sounds: Normal breath sounds.   Musculoskeletal:      Cervical back: Normal range of motion.   Skin:     General: Skin is warm and dry.   Neurological:      General: No focal deficit present.      Mental Status: He is alert and oriented to person, place, and time.   Psychiatric:         Mood and Affect: Mood normal.         Behavior: Behavior normal.          ASSESSMENT/PLAN:  1. Chest pain, unspecified type  -     AMB POC EKG ROUTINE  2. Anxiety  Follow-up with Psych to discuss med changes      --YENI Brown - NP

## 2024-05-03 ENCOUNTER — OFFICE VISIT (OUTPATIENT)
Age: 39
End: 2024-05-03

## 2024-05-03 VITALS
RESPIRATION RATE: 16 BRPM | HEIGHT: 69 IN | TEMPERATURE: 98 F | OXYGEN SATURATION: 100 % | HEART RATE: 82 BPM | DIASTOLIC BLOOD PRESSURE: 76 MMHG | SYSTOLIC BLOOD PRESSURE: 112 MMHG | BODY MASS INDEX: 25.68 KG/M2 | WEIGHT: 173.4 LBS

## 2024-05-03 DIAGNOSIS — L72.3 SEBACEOUS CYST: Primary | ICD-10-CM

## 2024-05-03 RX ORDER — CEPHALEXIN 500 MG/1
500 CAPSULE ORAL 4 TIMES DAILY
Qty: 40 CAPSULE | Refills: 0 | Status: SHIPPED | OUTPATIENT
Start: 2024-05-03 | End: 2024-05-13

## 2024-05-03 ASSESSMENT — PATIENT HEALTH QUESTIONNAIRE - PHQ9
1. LITTLE INTEREST OR PLEASURE IN DOING THINGS: NOT AT ALL
8. MOVING OR SPEAKING SO SLOWLY THAT OTHER PEOPLE COULD HAVE NOTICED. OR THE OPPOSITE, BEING SO FIGETY OR RESTLESS THAT YOU HAVE BEEN MOVING AROUND A LOT MORE THAN USUAL: NOT AT ALL
SUM OF ALL RESPONSES TO PHQ QUESTIONS 1-9: 3
10. IF YOU CHECKED OFF ANY PROBLEMS, HOW DIFFICULT HAVE THESE PROBLEMS MADE IT FOR YOU TO DO YOUR WORK, TAKE CARE OF THINGS AT HOME, OR GET ALONG WITH OTHER PEOPLE: NOT DIFFICULT AT ALL
3. TROUBLE FALLING OR STAYING ASLEEP: NOT AT ALL
SUM OF ALL RESPONSES TO PHQ QUESTIONS 1-9: 3
2. FEELING DOWN, DEPRESSED OR HOPELESS: NOT AT ALL
SUM OF ALL RESPONSES TO PHQ9 QUESTIONS 1 & 2: 0
7. TROUBLE CONCENTRATING ON THINGS, SUCH AS READING THE NEWSPAPER OR WATCHING TELEVISION: NOT AT ALL
SUM OF ALL RESPONSES TO PHQ QUESTIONS 1-9: 3
5. POOR APPETITE OR OVEREATING: NEARLY EVERY DAY
9. THOUGHTS THAT YOU WOULD BE BETTER OFF DEAD, OR OF HURTING YOURSELF: NOT AT ALL
6. FEELING BAD ABOUT YOURSELF - OR THAT YOU ARE A FAILURE OR HAVE LET YOURSELF OR YOUR FAMILY DOWN: NOT AT ALL
4. FEELING TIRED OR HAVING LITTLE ENERGY: NOT AT ALL
SUM OF ALL RESPONSES TO PHQ QUESTIONS 1-9: 3

## 2024-05-03 NOTE — PROGRESS NOTES
Alix Funez (:  1985) is a 38 y.o. male,here for evaluation of the following chief complaint(s):  Lump on Neck    /76   Pulse 82   Temp 98 °F (36.7 °C) (Temporal)   Resp 16   Ht 1.753 m (5' 9\")   Wt 78.7 kg (173 lb 6.4 oz)   SpO2 100%   BMI 25.61 kg/m²       SUBJECTIVE/OBJECTIVE:    HPI:Patient presents for lump on right side of neck x 2 days. It is painful to palpation. There appears to be reddened area in center, but no pustule or drainage. Just above this area he had a recent folliculitis. No other lymphadenopathy.    Review of Systems   Skin:         Lump of neck       Physical Exam  Constitutional:       Appearance: Normal appearance.   Skin:     Comments: 1.5 cm round subcutaneous lesion of right side of neck; reddened pinpoint area in center. Painful with palpation   Neurological:      General: No focal deficit present.      Mental Status: He is alert and oriented to person, place, and time.   Psychiatric:         Mood and Affect: Mood normal.         Behavior: Behavior normal.          ASSESSMENT/PLAN:  1. Sebaceous cyst  -     cephALEXin (KEFLEX) 500 MG capsule; Take 1 capsule by mouth 4 times daily for 10 days, Disp-40 capsule, R-0Normal  Antibiotic as prescribed  Advised US if no improvement over the next week    --YENI Brown - NP

## 2024-05-13 ENCOUNTER — OFFICE VISIT (OUTPATIENT)
Age: 39
End: 2024-05-13
Payer: COMMERCIAL

## 2024-05-13 VITALS
TEMPERATURE: 98 F | WEIGHT: 174.4 LBS | BODY MASS INDEX: 24.97 KG/M2 | RESPIRATION RATE: 16 BRPM | HEIGHT: 70 IN | SYSTOLIC BLOOD PRESSURE: 123 MMHG | DIASTOLIC BLOOD PRESSURE: 84 MMHG | OXYGEN SATURATION: 100 % | HEART RATE: 80 BPM

## 2024-05-13 DIAGNOSIS — Z00.00 ROUTINE PHYSICAL EXAMINATION: Primary | ICD-10-CM

## 2024-05-13 DIAGNOSIS — F33.42 RECURRENT MAJOR DEPRESSIVE DISORDER, IN FULL REMISSION (HCC): ICD-10-CM

## 2024-05-13 DIAGNOSIS — Z00.00 ROUTINE PHYSICAL EXAMINATION: ICD-10-CM

## 2024-05-13 DIAGNOSIS — E78.00 PURE HYPERCHOLESTEROLEMIA: ICD-10-CM

## 2024-05-13 DIAGNOSIS — F41.9 ANXIETY: ICD-10-CM

## 2024-05-13 DIAGNOSIS — Z72.0 TOBACCO USE: ICD-10-CM

## 2024-05-13 LAB
ALBUMIN SERPL-MCNC: 4.1 G/DL (ref 3.5–5)
ALBUMIN/GLOB SERPL: 1.1 (ref 1.1–2.2)
ALP SERPL-CCNC: 130 U/L (ref 45–117)
ALT SERPL-CCNC: 28 U/L (ref 12–78)
ANION GAP SERPL CALC-SCNC: 5 MMOL/L (ref 5–15)
AST SERPL-CCNC: 17 U/L (ref 15–37)
BILIRUB SERPL-MCNC: 0.3 MG/DL (ref 0.2–1)
BUN SERPL-MCNC: 13 MG/DL (ref 6–20)
BUN/CREAT SERPL: 14 (ref 12–20)
CALCIUM SERPL-MCNC: 9.9 MG/DL (ref 8.5–10.1)
CHLORIDE SERPL-SCNC: 108 MMOL/L (ref 97–108)
CHOLEST SERPL-MCNC: 258 MG/DL
CO2 SERPL-SCNC: 28 MMOL/L (ref 21–32)
CREAT SERPL-MCNC: 0.91 MG/DL (ref 0.7–1.3)
ERYTHROCYTE [DISTWIDTH] IN BLOOD BY AUTOMATED COUNT: 13 % (ref 11.5–14.5)
GLOBULIN SER CALC-MCNC: 3.6 G/DL (ref 2–4)
GLUCOSE SERPL-MCNC: 98 MG/DL (ref 65–100)
HCT VFR BLD AUTO: 44.8 % (ref 36.6–50.3)
HDLC SERPL-MCNC: 39 MG/DL
HDLC SERPL: 6.6 (ref 0–5)
HGB BLD-MCNC: 15.1 G/DL (ref 12.1–17)
LDLC SERPL CALC-MCNC: 189.8 MG/DL (ref 0–100)
MCH RBC QN AUTO: 30 PG (ref 26–34)
MCHC RBC AUTO-ENTMCNC: 33.7 G/DL (ref 30–36.5)
MCV RBC AUTO: 88.9 FL (ref 80–99)
NRBC # BLD: 0 K/UL (ref 0–0.01)
NRBC BLD-RTO: 0 PER 100 WBC
PLATELET # BLD AUTO: 334 K/UL (ref 150–400)
PMV BLD AUTO: 9.6 FL (ref 8.9–12.9)
POTASSIUM SERPL-SCNC: 4.5 MMOL/L (ref 3.5–5.1)
PROT SERPL-MCNC: 7.7 G/DL (ref 6.4–8.2)
RBC # BLD AUTO: 5.04 M/UL (ref 4.1–5.7)
SODIUM SERPL-SCNC: 141 MMOL/L (ref 136–145)
TRIGL SERPL-MCNC: 146 MG/DL
VLDLC SERPL CALC-MCNC: 29.2 MG/DL
WBC # BLD AUTO: 6 K/UL (ref 4.1–11.1)

## 2024-05-13 PROCEDURE — 99395 PREV VISIT EST AGE 18-39: CPT | Performed by: INTERNAL MEDICINE

## 2024-05-13 ASSESSMENT — PATIENT HEALTH QUESTIONNAIRE - PHQ9
2. FEELING DOWN, DEPRESSED OR HOPELESS: NOT AT ALL
9. THOUGHTS THAT YOU WOULD BE BETTER OFF DEAD, OR OF HURTING YOURSELF: NOT AT ALL
SUM OF ALL RESPONSES TO PHQ QUESTIONS 1-9: 2
6. FEELING BAD ABOUT YOURSELF - OR THAT YOU ARE A FAILURE OR HAVE LET YOURSELF OR YOUR FAMILY DOWN: NOT AT ALL
7. TROUBLE CONCENTRATING ON THINGS, SUCH AS READING THE NEWSPAPER OR WATCHING TELEVISION: NOT AT ALL
SUM OF ALL RESPONSES TO PHQ QUESTIONS 1-9: 2
5. POOR APPETITE OR OVEREATING: MORE THAN HALF THE DAYS
SUM OF ALL RESPONSES TO PHQ QUESTIONS 1-9: 2
SUM OF ALL RESPONSES TO PHQ9 QUESTIONS 1 & 2: 0
4. FEELING TIRED OR HAVING LITTLE ENERGY: NOT AT ALL
1. LITTLE INTEREST OR PLEASURE IN DOING THINGS: NOT AT ALL
8. MOVING OR SPEAKING SO SLOWLY THAT OTHER PEOPLE COULD HAVE NOTICED. OR THE OPPOSITE, BEING SO FIGETY OR RESTLESS THAT YOU HAVE BEEN MOVING AROUND A LOT MORE THAN USUAL: NOT AT ALL
10. IF YOU CHECKED OFF ANY PROBLEMS, HOW DIFFICULT HAVE THESE PROBLEMS MADE IT FOR YOU TO DO YOUR WORK, TAKE CARE OF THINGS AT HOME, OR GET ALONG WITH OTHER PEOPLE: NOT DIFFICULT AT ALL
SUM OF ALL RESPONSES TO PHQ QUESTIONS 1-9: 2
3. TROUBLE FALLING OR STAYING ASLEEP: NOT AT ALL

## 2024-05-13 ASSESSMENT — ENCOUNTER SYMPTOMS
SHORTNESS OF BREATH: 0
BLOOD IN STOOL: 0
COUGH: 0
NAUSEA: 0
CONSTIPATION: 0
VOMITING: 0
ABDOMINAL PAIN: 0

## 2024-05-13 ASSESSMENT — LIFESTYLE VARIABLES
HOW MANY STANDARD DRINKS CONTAINING ALCOHOL DO YOU HAVE ON A TYPICAL DAY: PATIENT DOES NOT DRINK
HOW OFTEN DO YOU HAVE A DRINK CONTAINING ALCOHOL: NEVER

## 2024-05-13 NOTE — ASSESSMENT & PLAN NOTE
he was counseled on the dangers of tobacco use.  He was advised to quit and reluctant to quit.  Reviewed strategies to maximize success, including removing cigarettes and smoking materials from environment, stress management, substitution of other forms of reinforcement, and support of family/friends.   He is aware of the long term concerns with smoking (lung cancer, COPD, etc).

## 2024-05-13 NOTE — PATIENT INSTRUCTIONS
Patient Education        Well Visit, Ages 18 to 65: Care Instructions  Well visits can help you stay healthy. Your doctor has checked your overall health and may have suggested ways to take good care of yourself. Your doctor also may have recommended tests. You can help prevent illness with healthy eating, good sleep, vaccinations, regular exercise, and other steps.    Get the tests that you and your doctor decide on. Depending on your age and risks, examples might include screening for diabetes; hepatitis C; HIV; and cervical, breast, lung, and colon cancer. Screening helps find diseases before any symptoms appear.   Eat healthy foods. Choose fruits, vegetables, whole grains, lean protein, and low-fat dairy foods. Limit saturated fat and reduce salt.     Limit alcohol. Men should have no more than 2 drinks a day. Women should have no more than 1. For some people, no alcohol is the best choice.   Exercise. Get at least 30 minutes of exercise on most days of the week. Walking can be a good choice.     Reach and stay at your healthy weight. This will lower your risk for many health problems.   Take care of your mental health. Try to stay connected with friends, family, and community, and find ways to manage stress.     If you're feeling depressed or hopeless, talk to someone. A counselor can help. If you don't have a counselor, talk to your doctor.   Talk to your doctor if you think you may have a problem with alcohol or drug use. This includes prescription medicines, marijuana, and other drugs.     Avoid tobacco and nicotine: Don't smoke, vape, or chew. If you need help quitting, talk to your doctor.   Practice safer sex. Getting tested, using condoms or dental dams, and limiting sex partners can help prevent STIs.     Use birth control if it's important to you to prevent pregnancy. Talk with your doctor about your choices and what might be best for you.   Prevent problems where you can. Protect your skin from too  No

## 2024-05-13 NOTE — ASSESSMENT & PLAN NOTE
Unclear control, he is off medication, will check labs due to life style changes and then revisit need for medication

## 2024-05-13 NOTE — PROGRESS NOTES
Alix Funez is a 38 y.o. male who was seen in clinic today (5/13/2024) for a full physical.        Assessment & Plan:   Below is the assessment and plan developed based on review of pertinent history, physical exam, labs, studies, and medications.    1. Routine physical examination  -     Comprehensive Metabolic Panel; Future  -     CBC; Future  -     Lipid Panel; Future  -     Hepatitis C Ab, Rflx to Qt by PCR; Future  2. Pure hypercholesterolemia  Assessment & Plan:  Unclear control, he is off medication, will check labs due to life style changes and then revisit need for medication   Orders:  -     Comprehensive Metabolic Panel; Future  -     Lipid Panel; Future  3. Tobacco use  Assessment & Plan:  he was counseled on the dangers of tobacco use.  He was advised to quit and reluctant to quit.  Reviewed strategies to maximize success, including removing cigarettes and smoking materials from environment, stress management, substitution of other forms of reinforcement, and support of family/friends.   He is aware of the long term concerns with smoking (lung cancer, COPD, etc).  4. Recurrent major depressive disorder, in full remission (HCC)  Assessment & Plan:  Doing well. Monitored by specialist- no acute findings meriting change in the plan  5. Anxiety  Assessment & Plan:  Well controlled. Monitored by specialist- no acute findings meriting change in the plan     Return in about 1 year (around 5/13/2025) for FULL PHYSICAL.   Subjective:   Alix is here today for a full physical.  Since last visit: weight has decreased.  He is down 20 lbs and exercising (walking 5-6 mi/day).    Health Maintenance  Immunizations:   Covid: declined  Influenza: declined   Tetanus: up to date    Pneumonia: declined  Cancer screening:   Guidelines regarding testicle, skin, colon, and prostate cancer reviewed with him.       The following acute and/or chronic problems were addressed today:    Cardiovascular Review  The patient has

## 2024-05-14 LAB
HCV AB SERPL QL IA: NORMAL
HCV IGG SERPL QL IA: NON REACTIVE S/CO RATIO

## 2024-05-14 NOTE — RESULT ENCOUNTER NOTE
Results released to patient via [employer].  All labs are stable or at goal for him, except for worsening lipids (off medications, even with weight loss).  Will recommend restarting statin as this is likely genetic.

## 2024-05-15 RX ORDER — ATORVASTATIN CALCIUM 10 MG/1
10 TABLET, FILM COATED ORAL DAILY
Qty: 90 TABLET | Refills: 3 | Status: SHIPPED | OUTPATIENT
Start: 2024-05-15

## 2024-11-07 ENCOUNTER — TELEPHONE (OUTPATIENT)
Age: 39
End: 2024-11-07

## 2024-11-07 NOTE — TELEPHONE ENCOUNTER
PSR transferred call to triage. Pt ID x 2. Pt has c/o fever 101.6, body aches, chills, and dysuria since this am. Pt denies N/V, SOB, CP or wheezing at this time. Advised pt to go to /walk-in facility this afternoon to be further evaluated, and to have UA done. Pt verbalized understanding, will forward to MD for review.

## 2024-11-21 ENCOUNTER — OFFICE VISIT (OUTPATIENT)
Age: 39
End: 2024-11-21
Payer: COMMERCIAL

## 2024-11-21 VITALS
BODY MASS INDEX: 25.28 KG/M2 | RESPIRATION RATE: 16 BRPM | DIASTOLIC BLOOD PRESSURE: 68 MMHG | HEIGHT: 70 IN | HEART RATE: 85 BPM | OXYGEN SATURATION: 99 % | SYSTOLIC BLOOD PRESSURE: 100 MMHG | WEIGHT: 176.6 LBS | TEMPERATURE: 98.3 F

## 2024-11-21 DIAGNOSIS — J01.10 ACUTE NON-RECURRENT FRONTAL SINUSITIS: Primary | ICD-10-CM

## 2024-11-21 PROCEDURE — 99213 OFFICE O/P EST LOW 20 MIN: CPT | Performed by: NURSE PRACTITIONER

## 2024-11-21 RX ORDER — METHYLPREDNISOLONE 4 MG/1
TABLET ORAL
Qty: 21 TABLET | Refills: 0 | Status: SHIPPED | OUTPATIENT
Start: 2024-11-21 | End: 2024-11-26

## 2024-11-21 SDOH — ECONOMIC STABILITY: FOOD INSECURITY: WITHIN THE PAST 12 MONTHS, THE FOOD YOU BOUGHT JUST DIDN'T LAST AND YOU DIDN'T HAVE MONEY TO GET MORE.: NEVER TRUE

## 2024-11-21 SDOH — ECONOMIC STABILITY: INCOME INSECURITY: HOW HARD IS IT FOR YOU TO PAY FOR THE VERY BASICS LIKE FOOD, HOUSING, MEDICAL CARE, AND HEATING?: NOT HARD AT ALL

## 2024-11-21 SDOH — ECONOMIC STABILITY: FOOD INSECURITY: WITHIN THE PAST 12 MONTHS, YOU WORRIED THAT YOUR FOOD WOULD RUN OUT BEFORE YOU GOT MONEY TO BUY MORE.: NEVER TRUE

## 2024-11-21 NOTE — PROGRESS NOTES
Alix Funez (:  1985) is a 38 y.o. male,here for evaluation of the following chief complaint(s):  Headache    /68 (Site: Left Upper Arm, Position: Sitting, Cuff Size: Medium Adult)   Pulse 85   Temp 98.3 °F (36.8 °C) (Temporal)   Resp 16   Ht 1.778 m (5' 10\")   Wt 80.1 kg (176 lb 9.6 oz)   SpO2 99%   BMI 25.34 kg/m²       SUBJECTIVE/OBJECTIVE:    HPI:Patient reports headache daily x 1 week. He has taken excedrin migraine which helps, but then it comes back the next day. He has applied ice across forehead when severe. It starts above eyes and migrates to top of forehead. No dizziness. No blurred vision. He was sick with URI symptoms about 3 weeks ago, but that seemed to get better until the headache started. He drinks very little water. He drinks caffeine all day.    Review of Systems    Physical Exam  Constitutional:       Appearance: Normal appearance.   HENT:      Head: Normocephalic.      Right Ear: Tympanic membrane, ear canal and external ear normal.      Left Ear: Tympanic membrane, ear canal and external ear normal.      Nose:      Comments: Swollen turbinates; frontal sinus pain with palpation bilaterally     Mouth/Throat:      Mouth: Mucous membranes are moist.   Eyes:      Extraocular Movements: Extraocular movements intact.      Conjunctiva/sclera: Conjunctivae normal.      Pupils: Pupils are equal, round, and reactive to light.   Cardiovascular:      Rate and Rhythm: Normal rate and regular rhythm.   Pulmonary:      Effort: Pulmonary effort is normal.      Breath sounds: Normal breath sounds.   Musculoskeletal:         General: Normal range of motion.   Skin:     General: Skin is warm and dry.   Neurological:      General: No focal deficit present.      Mental Status: He is alert and oriented to person, place, and time.      Gait: Gait normal.   Psychiatric:         Mood and Affect: Mood normal.         Behavior: Behavior normal.          ASSESSMENT/PLAN:  1. Acute non-recurrent

## 2025-05-28 RX ORDER — ATORVASTATIN CALCIUM 10 MG/1
10 TABLET, FILM COATED ORAL DAILY
Qty: 90 TABLET | Refills: 0 | Status: SHIPPED | OUTPATIENT
Start: 2025-05-28

## 2025-09-03 RX ORDER — ATORVASTATIN CALCIUM 10 MG/1
10 TABLET, FILM COATED ORAL DAILY
Qty: 30 TABLET | Refills: 0 | Status: SHIPPED | OUTPATIENT
Start: 2025-09-03

## (undated) DEVICE — CLICKLINE SCISSORS INSERT: Brand: CLICKLINE

## (undated) DEVICE — INSUFFLATION NEEDLE: Brand: SURGINEEDLE

## (undated) DEVICE — Z INACTIVE NO USAGE TURNOVER KIT RM CLEANOP

## (undated) DEVICE — APPLIER LIG CLP L13IN 10MM PSTL GRP CONTAIN 15 TI L CLP

## (undated) DEVICE — (D)PREP SKN CHLRAPRP APPL 26ML -- CONVERT TO ITEM 371833

## (undated) DEVICE — SURGICAL PROCEDURE KIT GEN LAPAROSCOPY LF

## (undated) DEVICE — 3000CC GUARDIAN II: Brand: GUARDIAN

## (undated) DEVICE — REM POLYHESIVE ADULT PATIENT RETURN ELECTRODE: Brand: VALLEYLAB

## (undated) DEVICE — DERMABOND SKIN ADH 0.7ML -- DERMABOND ADVANCED 12/BX

## (undated) DEVICE — SUTURE MCRYL SZ 4-0 L27IN ABSRB UD L19MM PS-2 1/2 CIR PRIM Y426H

## (undated) DEVICE — SOLUTION IRRIGATION NACL 0.9% 1000 ML FLX CONTAINER

## (undated) DEVICE — FILTER SMK EVAC FLO CLR MEGADYNE

## (undated) DEVICE — SUTURE SZ 0 27IN 5/8 CIR UR-6  TAPER PT VIOLET ABSRB VICRYL J603H

## (undated) DEVICE — TUBING INSUFLTN 10FT LUER -- CONVERT TO ITEM 368568

## (undated) DEVICE — KENDALL SCD EXPRESS SLEEVES, KNEE LENGTH, MEDIUM: Brand: KENDALL SCD

## (undated) DEVICE — DEVON™ KNEE AND BODY STRAP 60" X 3" (1.5 M X 7.6 CM): Brand: DEVON

## (undated) DEVICE — SPECIMEN RETRIEVAL POUCH: Brand: ENDO CATCH GOLD

## (undated) DEVICE — NEEDLE HYPO 25GA L1.5IN BVL ORIENTED ECLIPSE

## (undated) DEVICE — DISSECTOR RMFG CURVED 5MM --

## (undated) DEVICE — BLADELESS OPTICAL TROCAR WITH FIXATION CANNULA: Brand: VERSAONE

## (undated) DEVICE — UNIVERSAL FIXATION CANNULA: Brand: VERSAONE

## (undated) DEVICE — Device

## (undated) DEVICE — STERILE POLYISOPRENE POWDER-FREE SURGICAL GLOVES WITH EMOLLIENT COATING: Brand: PROTEXIS

## (undated) DEVICE — BLADELESS OPTICAL TROCAR WITH FIXATION CANNULA: Brand: VERSAPORT